# Patient Record
Sex: MALE | Race: WHITE | NOT HISPANIC OR LATINO | Employment: OTHER | URBAN - METROPOLITAN AREA
[De-identification: names, ages, dates, MRNs, and addresses within clinical notes are randomized per-mention and may not be internally consistent; named-entity substitution may affect disease eponyms.]

---

## 2017-07-12 ENCOUNTER — OFFICE VISIT (OUTPATIENT)
Dept: ORTHOPEDIC SURGERY | Facility: CLINIC | Age: 62
End: 2017-07-12

## 2017-07-12 VITALS — BODY MASS INDEX: 36.76 KG/M2 | HEIGHT: 70 IN | WEIGHT: 256.8 LBS | TEMPERATURE: 98.1 F

## 2017-07-12 DIAGNOSIS — M25.571 ACUTE RIGHT ANKLE PAIN: Primary | ICD-10-CM

## 2017-07-12 PROCEDURE — 99203 OFFICE O/P NEW LOW 30 MIN: CPT | Performed by: ORTHOPAEDIC SURGERY

## 2017-07-12 RX ORDER — METOPROLOL SUCCINATE 25 MG/1
25 TABLET, EXTENDED RELEASE ORAL 2 TIMES DAILY
COMMUNITY

## 2017-07-12 RX ORDER — OMEPRAZOLE 20 MG/1
20 CAPSULE, DELAYED RELEASE ORAL DAILY
COMMUNITY

## 2017-07-12 RX ORDER — TAMSULOSIN HYDROCHLORIDE 0.4 MG/1
1 CAPSULE ORAL NIGHTLY
COMMUNITY

## 2017-07-12 RX ORDER — FENOFIBRATE 160 MG/1
160 TABLET ORAL DAILY
COMMUNITY

## 2017-07-12 NOTE — PROGRESS NOTES
Chief Complaint   Patient presents with   • Right Ankle - Establish Care, Pain             HPI  In April, patient got up from chair and ankle popped.  Over time, it got worse.  Pain is on medial aspect of ankle and arch of foot.Patient has been having progressive pain and discomfort since April 2017.  He feels like he is walking on his instep.  His foot keeps going into eversion.  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  By the end of the day he has a dull aching pain on the medial aspect of the foot.  The pain radiates into the forefoot and is associated with some burning sensation along with paresthesias.  He states that he cannot walk briskly because of pain and discomfort.  His symptoms are worse when he is driving for prolonged periods of time.  He has difficulty with flexibility of the hindfoot and finds it difficult to walk on surfaces that are uneven such errors gravel driveways and grassy lots.          No Known Allergies      Social History     Social History   • Marital status:      Spouse name: N/A   • Number of children: N/A   • Years of education: N/A     Occupational History   • Not on file.     Social History Main Topics   • Smoking status: Not on file   • Smokeless tobacco: Not on file   • Alcohol use Not on file   • Drug use: Not on file   • Sexual activity: Not on file     Other Topics Concern   • Not on file     Social History Narrative       No family history on file.    No past surgical history on file.    No past medical history on file.        Vitals:    07/12/17 0847   Temp: 98.1 °F (36.7 °C)             Review of Systems   Constitutional: Negative for chills, fever and unexpected weight change.   HENT: Negative for trouble swallowing and voice change.    Eyes: Negative for visual disturbance.   Respiratory: Negative for cough and shortness of breath.    Cardiovascular: Negative for chest pain and leg swelling.   Gastrointestinal:  Negative for abdominal pain, nausea and vomiting.   Endocrine: Negative for cold intolerance and heat intolerance.   Genitourinary: Negative for difficulty urinating, frequency and urgency.   Musculoskeletal: Positive for arthralgias, joint swelling and myalgias.   Skin: Negative for rash and wound.   Allergic/Immunologic: Negative for immunocompromised state.   Neurological: Negative for weakness and numbness.   Hematological: Does not bruise/bleed easily.   Psychiatric/Behavioral: Negative for dysphoric mood. The patient is not nervous/anxious.            Physical Exam   Constitutional: He is oriented to person, place, and time. He appears well-developed.   HENT:   Head: Normocephalic.   Eyes: Pupils are equal, round, and reactive to light.   Neck: Normal range of motion.   Cardiovascular: Normal rate and intact distal pulses.    Pulmonary/Chest: Effort normal and breath sounds normal.   Abdominal: Soft. Bowel sounds are normal.   Musculoskeletal: Normal range of motion. He exhibits edema, tenderness and deformity.   Neurological: He is alert and oriented to person, place, and time. He has normal reflexes.   Skin: Skin is dry.   Psychiatric: He has a normal mood and affect. His behavior is normal. Judgment and thought content normal.   Nursing note and vitals reviewed.              Joint/Body Part Specific Exam:Right foot:  There is exquisite tenderness posterior to the medial malleolus. Inversion against resistance bothers the patient significantly. Eversion causes pain and discomfort. Longitudinal arches of the foot are poorly preserved. Dorsalis pedis and posterior tibial artery pulses are palpable. Dorsiflexion 0-20 degrees, plantarflexion 0-30 degrees. Ankle mortise is stable. There is a lot of tenderness and swelling along the musculotendinous junction over the posterior tibial and muscle tendon junction. “Too many toes” sign is positive. Patient is unable to perform a single heel raise.  Dorsiflexion and  plantarflexion are both painful for the patient.  He finds it difficult to circumduct his ankle as well.  There is a tendency towards eversion of the hindfoot.          X-RAY Report:  X-rays from an outside facility are reviewed.  The architecture of the hindfoot is fairly well preserved.  No fractures are noted.  There are some degenerative changes with narrowing of the inter-tarsal joint spaces.          Diagnostics:            Dav was seen today for establish care and pain.    Diagnoses and all orders for this visit:    Acute right ankle pain  -     MRI Ankle Right Without Contrast; Future          Procedures          I provided this patient with educational materials regarding bone health.        Plan:   Use a supportive arch supports and good shoes to help support the hindfoot and the posterior tibial tendon function.    Tablet diclofenac 75 mg tab 1 by mouth twice a day when necessary pain swelling and discomfort.     Steroid injection to the posterior tibial tendon sheath discussed with the patient great deal.    Schedule an MRI of the right ankle for evaluation of condition of the posterior tibial tendon.    If he does have a posterior tibial tendon tear he might need reconstruction of the tendon or possibly a hindfoot fusion if the tendon is completely torn and the longitudinal arches of the.    Follow-up in my office in 4 weeks to review the MRI and then possibly set him up for referral to a foot and ankle subspecialist.        CC To Delfin Lyn MD

## 2017-07-18 PROBLEM — M25.571 ACUTE RIGHT ANKLE PAIN: Status: ACTIVE | Noted: 2017-07-18

## 2017-07-26 ENCOUNTER — OFFICE VISIT (OUTPATIENT)
Dept: ORTHOPEDIC SURGERY | Facility: CLINIC | Age: 62
End: 2017-07-26

## 2017-07-26 DIAGNOSIS — M25.571 ACUTE RIGHT ANKLE PAIN: ICD-10-CM

## 2017-07-26 PROCEDURE — 73721 MRI JNT OF LWR EXTRE W/O DYE: CPT | Performed by: ORTHOPAEDIC SURGERY

## 2017-08-14 ENCOUNTER — OFFICE VISIT (OUTPATIENT)
Dept: ORTHOPEDIC SURGERY | Facility: CLINIC | Age: 62
End: 2017-08-14

## 2017-08-14 DIAGNOSIS — M25.571 ACUTE RIGHT ANKLE PAIN: Primary | ICD-10-CM

## 2017-08-14 PROCEDURE — 99213 OFFICE O/P EST LOW 20 MIN: CPT | Performed by: ORTHOPAEDIC SURGERY

## 2017-08-14 NOTE — PROGRESS NOTES
Chief Complaint   Patient presents with   • Right Ankle - Follow-up   • Results     RIGHT ANKLE MRI RESULTS   Patient is here today for his right ankle MRI results.    HPI patient states that he has some pain and discomfort on the posterior medial aspect of the ankle.  Dorsiflexion of the ankle against resistance does bother him to some degree.  Occasionally there is a sense of catching in the ankle.  By the end of the day he does have quite a bit of swelling and his ankle feels tight.  Patient finds it difficult to walk on uneven surfaces.  Going up and down the steps is painful for him.  He states that he has rolled his ankle a couple different times and that is relatively uncomfortable for him.  His [place] visual foot arches are poorly developed and he feels like he is progressively getting flat footed.        There were no vitals filed for this visit.        Review of Systems   Constitutional: Negative.    HENT: Negative.    Eyes: Negative.    Respiratory: Negative.    Cardiovascular: Negative.    Gastrointestinal: Negative.    Endocrine: Negative.    Genitourinary: Negative.    Musculoskeletal: Negative.    Skin: Negative.    Allergic/Immunologic: Negative.    Neurological: Negative.    Hematological: Negative.    Psychiatric/Behavioral: Negative.            Physical Exam   Constitutional: He is oriented to person, place, and time. He appears well-nourished.   HENT:   Head: Atraumatic.   Eyes: EOM are normal.   Neck: Neck supple.   Cardiovascular: Normal rate and intact distal pulses.    Pulmonary/Chest: Breath sounds normal.   Abdominal: Bowel sounds are normal.   Musculoskeletal: He exhibits edema and tenderness. He exhibits no deformity.   Neurological: He is alert and oriented to person, place, and time. He has normal reflexes.   Skin: Skin is dry.   Psychiatric: He has a normal mood and affect. His behavior is normal. Judgment and thought content normal.   Nursing note and vitals  reviewed.          Joint/Body Part Specific Exam:Right ankle:  Patient has diffuse ill-defined tenderness with bruising and swelling over the anterior talofibular ligament. Inversion and eversion against resistance are painful for the patient. Some tenderness is noted posteriorly over the calcaneal fibular ligament as well. Medially the deltoid ligament is swollen and tender. Dorsalis pedis and posterior tibial artery pulses are palpable. Common peroneal nerve function is well preserved. There is no evidence of a proximal fibular injury. Distal tibiofibular syndesmotic ligament appears to be intact. External rotation and squeeze tests are both negative.  Dorsiflexion 0-20°.  Plantar flexion 0-30°.  Patient is able to circumduct the ankle without too much difficulty.      X-RAY Report:        Diagnostics:  MRI report from [facility] shows that the patient has had a previous medial sided ankle sprain.  The lateral ligaments are intact.  There is a small cyst on the medial edge of the talar dome with overlying chondromalacia.  There is some synovitis along the posterior tibial tendon sheath without a distinct tear.  There is some edema on the sinus Tarsi region as well.  No osteochondral fractures are noted.      Dav was seen today for results and follow-up.    Diagnoses and all orders for this visit:    Acute right ankle pain          Procedures        Plan:  Nonoperative care discussed with the patient.    Use a supportive active ankle brace.    Tablet ibuprofen 600 mg orally twice a day when necessary pain and discomfort.    Local application of ice for physical comfort.    Calcium and vitamin D for bone health.    Reinjury precautions.    Follow-up in my office in 6 months for reevaluation.    Nonoperative care at this point.

## 2018-11-26 ENCOUNTER — OFFICE VISIT CONVERTED (OUTPATIENT)
Dept: SURGERY | Facility: CLINIC | Age: 63
End: 2018-11-26
Attending: UROLOGY

## 2019-05-07 ENCOUNTER — HOSPITAL ENCOUNTER (OUTPATIENT)
Dept: SURGERY | Facility: CLINIC | Age: 64
Discharge: HOME | End: 2019-05-07

## 2019-05-07 ENCOUNTER — PROCEDURE VISIT CONVERTED (OUTPATIENT)
Dept: SURGERY | Facility: CLINIC | Age: 64
End: 2019-05-07
Attending: UROLOGY

## 2019-05-14 ENCOUNTER — OFFICE VISIT CONVERTED (OUTPATIENT)
Dept: SURGERY | Facility: CLINIC | Age: 64
End: 2019-05-14
Attending: UROLOGY

## 2019-05-14 ENCOUNTER — CONVERSION ENCOUNTER (OUTPATIENT)
Dept: SURGERY | Facility: CLINIC | Age: 64
End: 2019-05-14

## 2021-02-24 ENCOUNTER — CONVERSION ENCOUNTER (OUTPATIENT)
Dept: SURGERY | Facility: CLINIC | Age: 66
End: 2021-02-24

## 2021-02-24 ENCOUNTER — OFFICE VISIT CONVERTED (OUTPATIENT)
Dept: UROLOGY | Facility: CLINIC | Age: 66
End: 2021-02-24
Attending: UROLOGY

## 2021-05-14 VITALS — WEIGHT: 242.12 LBS | RESPIRATION RATE: 16 BRPM | BODY MASS INDEX: 33.9 KG/M2 | HEIGHT: 71 IN

## 2021-05-14 NOTE — PROGRESS NOTES
Progress Note      Patient Name: Dav Lu   Patient ID: [id number]   Sex: Male   YOB: 1955    Primary Care Provider: Delfin Lyn MD   Referring Provider: Delfin Lyn MD    Visit Date: February 24, 2021    Provider: Flora Perez MD   Location: [facility]   Location Address: [address] [place] [address]  [id number]   Location Phone: [phone]          Chief Complaint  · Urologic complaint      History Of Present Illness     This patient is a 62yo male. He presents from Dr. Lyn for eval of PSA level.   PSA 9/2017 - 4.3  PSA 7/2017 - 5.3  Patient was given abx after first PSA and then had it rechecked.   The patient admits to nocturia 2-3 times per night along with some hesitancy. He is on Flomax for urinary symptoms.  There is family hx of prostate cancer in father who was treated in the his late 80s.   Patient has never had prostate biopsy.    11/26/18 - Patient presents in follow up. He is doing well. He is still on Flomax. He had a repeat PSA and this is now 5.58. We discussed this. I do not know what has caused the PSA to be elevated. I discussed that I would recommend a TRUS bx of the prostate gland. We discussed doing an MRI of the prostate to eval for suspicious areas. He would like to think about things. He has new insurance starting in 2019 and will decide around that time.    5/14/19 - Patient presents in follow up. He is doing well after having a TRUS bx of the prostate. This showed benign tissue with some inflammation. We discussed this. We discussed the possibility of a false negative study. I have recommended continuing to follow the PSA.    Update 2/24/21: Presents for follow up with PSA prior.  Complains of baseline lower urinary tract symptoms, slow flow, most bothersome is nocturia. Manages with decreasing fluid intake prior to sleep; getting up 1x per night; also has some urgency to the point of leakage on occasion. Most bothersome when  "getting.  Takes Flomax and knows that it helps his symptoms significantly. Also has ED for which he takes Cialis 20mg gets about 75% of erection; he is ok with that.     ________  PSA  6/4/2020: 5.13  7/18/2018: 5.58  9/2017: 4.3  7/2017: 5.3    Total testosterone, 6/4/2020: 255    TRUS biopsy pathology, 5/2019: Benign prostatic tissue 12 of 12 cores       Past Medical History  Atrial Fibrillation; BPH; BPH with Urinary Obstruction; ED; Elevated PSA; Gastroesophageal Reflux Disorder; High blood pressure; High Triglycerides; Larynx cancer; Pancreatitis         Past Surgical History  Cholecystectomy; Port-a-cath Placement; Josh Cath Removal; Prostate Biopsy; Tracheostomy, temporary         Medication List  fenofibrate 160 mg oral tablet; levothyroxine 75 mcg oral tablet; metoprolol tartrate 25 mg oral tablet; omeprazole 20 mg oral capsule,delayed release(DR/EC); tamsulosin 0.4 mg oral capsule,extended release 24hr; Vitamin D3 2,000 unit oral capsule         Allergy List  NO KNOWN DRUG ALLERGIES       Allergies Reconciled  Family Medical History  Hypertension         Social History  Tobacco (Former)         Review of Systems  · Constitutional  o Denies  o : fever, chills  · Gastrointestinal  o Denies  o : nausea, vomiting      Vitals  Date Time BP Position Site L\R Cuff Size HR RR TEMP (F) WT  HT  BMI kg/m2 BSA m2 O2 Sat FR L/min FiO2 HC       02/24/2021 09:29 AM       16  242lbs 2oz 5'  11\" 33.77 2.35             Physical Examination  · Constitutional  o Appearance  o : Well nourished, well developed patient in no acute distress.  · Eyes  o Conjunctivae  o : Conjunctivae normal  o Sclerae  o : Sclerae white  · Ears, Nose, Mouth and Throat  o Ears  o :   § Hearing  § : Intact to conversational voice both ears  § Ears  § : Normal  o Nose  o :   § External Nose  § : Appearance normal  · Skin and Subcutaneous Tissue  o General Inspection  o : No rashes, lesions, or areas of discoloration present  o General Palpation  o : " Skin Turgor Normal  · Neurologic  o Mental Status Examination  o :   § Orientation  § : Alert and Oriented X3  o Gait and Station  o :   § Gait Screening  § : Ambulating without difficulty  · Psychiatric  o Mood and Affect  o : Mood normal, affect appropriate              Assessment  · Elevated PSA     790.93/R97.20  · Urinary urgency     788.63/R39.15  · BPH w urinary obs/LUTS       Benign prostatic hyperplasia with lower urinary tract symptoms     600.01/N40.1    Problems Reconciled  Plan  · Orders  o PSA Ultrasensitive, ANNUAL SCREENING [facility] ([id number], ) - 790.93/R97.20 - 08/24/2021  · Medications  o Medications have been Reconciled  o Transition of Care or Provider Policy  · Instructions  o Electronically Identified Patient Education Materials Provided Electronically     Elevated PSA, most recent 6/2020 of 5.13, stable trend from previous; status post negative biopsy in 2019.  Recommend continued surveillance; due to have annual PSA in June.    Other options provided include to proceed with further work-up via prostate MRI; discussed that this would likely be the recommendation should there be a significant change in trend; patient is understanding and agreeable to this.  Notes understanding that whether or not further workup for prostate cancer is pursued,  a diagnosis of clinically significant prostate cancer would remains uncertain unless detected on biopsy. A negative biopsy, although reassuring, would not eliminate the possible need for further surveillance of PSA, possible future biopsy, and imaging as he may have undetected prostate cancer.    BPH with lower urinary tract symptomscontinue Flomax    Overactive bladder, urinary urgencythis was discussed at length; discussed the importance of behavioral modifications; fluid management, timed and double voiding, avoiding bladder irritants.  Given patient's associated bother when going out; perform trial of oxybutynin IR 5 mg as needed urinary urgency;  instructed to take 30-45 minutes prior to going out.  Side effects discussed including dry mouth and constipation.  Patient encouraged to call office should this medication be of benefit or using consistently as could convert to an extended release tab that he would take daily rather than as needed  Prescription sent to pharmacy    Follow-up 6 months with PSA prior (anticipates getting this through PCP) or earlier as needed  All question addressed          MDM moderate: 2 stable chronic illnesses; 1 undiagnosed new problem with uncertain prognosis (urinary urgency, OAB); reviewed result of test; ordered test; provided independent interpretation of the results; prescription drug management             Electronically Signed by: Flora Perez MD -Author on February 24, 2021 09:51:55 AM

## 2021-05-15 VITALS — WEIGHT: 252 LBS | BODY MASS INDEX: 35.28 KG/M2 | HEIGHT: 71 IN | RESPIRATION RATE: 16 BRPM

## 2021-05-16 VITALS — HEIGHT: 71 IN | BODY MASS INDEX: 35.3 KG/M2 | WEIGHT: 252.12 LBS | RESPIRATION RATE: 12 BRPM

## 2022-02-25 ENCOUNTER — TRANSCRIBE ORDERS (OUTPATIENT)
Dept: ADMINISTRATIVE | Facility: HOSPITAL | Age: 67
End: 2022-02-25

## 2022-02-25 DIAGNOSIS — R07.9 CHEST PAIN, UNSPECIFIED TYPE: Primary | ICD-10-CM

## 2022-02-25 DIAGNOSIS — R94.31 ABNORMAL EKG: ICD-10-CM

## 2022-03-17 ENCOUNTER — APPOINTMENT (OUTPATIENT)
Dept: NUCLEAR MEDICINE | Facility: HOSPITAL | Age: 67
End: 2022-03-17

## 2022-08-04 ENCOUNTER — HOSPITAL ENCOUNTER (OUTPATIENT)
Dept: NUCLEAR MEDICINE | Facility: HOSPITAL | Age: 67
Discharge: HOME | End: 2022-08-04

## 2022-08-04 DIAGNOSIS — R94.31 ABNORMAL EKG: ICD-10-CM

## 2022-08-04 DIAGNOSIS — R07.9 CHEST PAIN, UNSPECIFIED TYPE: ICD-10-CM

## 2022-08-04 LAB
BH CV IMMEDIATE POST RECOVERY TECH DATA SYMPTOMS: NORMAL
BH CV IMMEDIATE POST TECH DATA BLOOD PRESSURE: NORMAL MMHG
BH CV IMMEDIATE POST TECH DATA HEART RATE: 82 BPM
BH CV IMMEDIATE POST TECH DATA OXYGEN SATS: 94 %
BH CV REST NUCLEAR ISOTOPE DOSE: 10.1 MCI
BH CV SIX MINUTE RECOVERY TECH DATA BLOOD PRESSURE: NORMAL
BH CV SIX MINUTE RECOVERY TECH DATA HEART RATE: 76 BPM
BH CV SIX MINUTE RECOVERY TECH DATA OXYGEN SATURATION: 96 %
BH CV STRESS BP STAGE 1: NORMAL
BH CV STRESS COMMENTS STAGE 1: NORMAL
BH CV STRESS DOSE REGADENOSON STAGE 1: 0.4
BH CV STRESS DURATION MIN STAGE 1: 0
BH CV STRESS DURATION SEC STAGE 1: 10
BH CV STRESS HR STAGE 1: 82
BH CV STRESS NUCLEAR ISOTOPE DOSE: 37.5 MCI
BH CV STRESS O2 STAGE 1: 94
BH CV STRESS PROTOCOL 1: NORMAL
BH CV STRESS RECOVERY BP: NORMAL MMHG
BH CV STRESS RECOVERY HR: 76 BPM
BH CV STRESS RECOVERY O2: 96 %
BH CV STRESS STAGE 1: 1
BH CV THREE MINUTE POST TECH DATA BLOOD PRESSURE: NORMAL MMHG
BH CV THREE MINUTE POST TECH DATA HEART RATE: 76 BPM
BH CV THREE MINUTE POST TECH DATA OXYGEN SATURATION: 96 %
LV EF NUC BP: 45 %
MAXIMAL PREDICTED HEART RATE: 153 BPM
PERCENT MAX PREDICTED HR: 56.86 %
STRESS BASELINE BP: NORMAL MMHG
STRESS BASELINE HR: 58 BPM
STRESS O2 SAT REST: 95 %
STRESS PERCENT HR: 67 %
STRESS POST O2 SAT PEAK: 95 %
STRESS POST PEAK BP: NORMAL MMHG
STRESS POST PEAK HR: 87 BPM
STRESS TARGET HR: 130 BPM

## 2022-08-04 PROCEDURE — 93017 CV STRESS TEST TRACING ONLY: CPT

## 2022-08-04 PROCEDURE — 0 TECHNETIUM TETROFOSMIN KIT: Performed by: INTERNAL MEDICINE

## 2022-08-04 PROCEDURE — 78452 HT MUSCLE IMAGE SPECT MULT: CPT | Performed by: INTERNAL MEDICINE

## 2022-08-04 PROCEDURE — 78452 HT MUSCLE IMAGE SPECT MULT: CPT

## 2022-08-04 PROCEDURE — 25010000002 REGADENOSON 0.4 MG/5ML SOLUTION

## 2022-08-04 PROCEDURE — A9502 TC99M TETROFOSMIN: HCPCS | Performed by: INTERNAL MEDICINE

## 2022-08-04 PROCEDURE — 93018 CV STRESS TEST I&R ONLY: CPT | Performed by: INTERNAL MEDICINE

## 2022-08-04 RX ORDER — LEVOTHYROXINE SODIUM 0.07 MG/1
75 TABLET ORAL DAILY
COMMUNITY

## 2022-08-04 RX ADMIN — REGADENOSON 0.4 MG: 0.08 INJECTION, SOLUTION INTRAVENOUS at 10:17

## 2022-08-04 RX ADMIN — TETROFOSMIN 1 DOSE: 1.38 INJECTION, POWDER, LYOPHILIZED, FOR SOLUTION INTRAVENOUS at 08:40

## 2022-08-04 RX ADMIN — TETROFOSMIN 1 DOSE: 1.38 INJECTION, POWDER, LYOPHILIZED, FOR SOLUTION INTRAVENOUS at 10:17

## 2022-11-30 ENCOUNTER — OFFICE VISIT (OUTPATIENT)
Dept: CARDIOLOGY | Facility: CLINIC | Age: 67
End: 2022-11-30

## 2022-11-30 VITALS
HEIGHT: 71 IN | SYSTOLIC BLOOD PRESSURE: 123 MMHG | WEIGHT: 238 LBS | BODY MASS INDEX: 33.32 KG/M2 | DIASTOLIC BLOOD PRESSURE: 84 MMHG | HEART RATE: 83 BPM

## 2022-11-30 DIAGNOSIS — R94.39 ABNORMAL NUCLEAR STRESS TEST: Primary | ICD-10-CM

## 2022-11-30 DIAGNOSIS — R06.02 SOB (SHORTNESS OF BREATH): ICD-10-CM

## 2022-11-30 DIAGNOSIS — I10 ESSENTIAL HYPERTENSION: ICD-10-CM

## 2022-11-30 PROCEDURE — 99204 OFFICE O/P NEW MOD 45 MIN: CPT | Performed by: INTERNAL MEDICINE

## 2022-11-30 RX ORDER — ASPIRIN 81 MG/1
81 TABLET ORAL DAILY
Qty: 30 TABLET | Refills: 1 | Status: SHIPPED | OUTPATIENT
Start: 2022-11-30

## 2022-11-30 NOTE — ASSESSMENT & PLAN NOTE
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  LV ejection fraction 45%.  Patient denies any significant chest pain but does report exertional shortness of breath.  We will do an echocardiogram as a first step to reassess LV function and wall motion abnormalities.  Recommend to start taking aspirin 81 mg p.o. daily.  We will get the latest labs including lipid panel from PCP office.  Statins to be initiated after reviewing the labs.  Eventually, he will need a Cardiac catheterization to delineate the coronary anatomy because of the findings on SPECT imaging and exertional shortness of breath.

## 2022-11-30 NOTE — PROGRESS NOTES
CARDIOLOGY INITIAL CONSULT       Chief Complaint  ABNORMAL STRESS TEST, Hypertension, and Fatigue    Subjective            Dav Lu presents to [facility] CARD[facility]  History of Present Illness    This is a 67-year-old male with hypertension, hypothyroidism and GERD.  He was referred for cardiac evaluation because of an abnormal stress test.  During routine office visit with PCP in January, he underwent EKG which showed presence of an old infarct.  Subsequently a SPECT stress test was scheduled.  Patient finally had the test done in August of this year which showed old inferior wall infarct with ejection fraction 45%.  There is some suggestion of mild carissa-infarct ischemia as well.    Patient does not have history of myocardial infarction.  He had an episode of chest pain in the past, which was treated in the emergency room.  He had an echocardiogram in 2014 during admission for gallstone pancreatitis.  Echo was reported as normal study.  He denies any recent episodes of chest pain.  He gets short of breath and fatigue on mild-to-moderate exertion.  Denies palpitations or dizziness.       Past History:    Medical History:  Past Medical History:   Diagnosis Date   • Hyperlipidemia    • Hypertension        Surgical History: has a past surgical history that includes Cholecystectomy (2014).     Family History: family history includes Heart disease in his father.     Social History: reports that he has never smoked. He has never used smokeless tobacco. He reports that he does not currently use alcohol. He reports that he does not use drugs.    Allergies: Patient has no known allergies.    Current Outpatient Medications on File Prior to Visit   Medication Sig   • fenofibrate 160 MG tablet Take 160 mg by mouth Daily.   • levothyroxine (SYNTHROID, LEVOTHROID) 75 MCG tablet Take 75 mcg by mouth Daily.   • metoprolol succinate XL (TOPROL-XL) 25 MG 24 hr tablet Take 25 mg by mouth 2 (Two) Times a Day.  "  • omeprazole (priLOSEC) 20 MG capsule Take 20 mg by mouth Daily.   • tamsulosin (FLOMAX) 0.4 MG capsule 24 hr capsule Take 1 capsule by mouth Every Night.     No current facility-administered medications on file prior to visit.          Review of Systems   Constitutional: Negative for fatigue, unexpected weight gain and unexpected weight loss.   Eyes: Negative for double vision.   Respiratory: Positive for shortness of breath. Negative for cough and wheezing.    Cardiovascular: Negative for chest pain, palpitations and leg swelling.   Gastrointestinal: Negative for abdominal pain, nausea and vomiting.   Endocrine: Negative for cold intolerance, heat intolerance, polydipsia and polyuria.   Musculoskeletal: Negative for arthralgias and back pain.   Skin: Negative for color change.   Neurological: Negative for dizziness, syncope, weakness and headache.   Hematological: Does not bruise/bleed easily.        Objective     /84   Pulse 83   Ht 180.3 cm (71\")   Wt 108 kg (238 lb)   BMI 33.19 kg/m²       Physical Exam  Constitutional:       General: He is awake. He is not in acute distress.     Appearance: Normal appearance.   Eyes:      Extraocular Movements: Extraocular movements intact.      Pupils: Pupils are equal, round, and reactive to light.   Neck:      Thyroid: No thyromegaly.      Vascular: No carotid bruit or JVD.   Cardiovascular:      Rate and Rhythm: Normal rate and regular rhythm.      Chest Wall: PMI is not displaced.      Heart sounds: Normal heart sounds, S1 normal and S2 normal. No murmur heard.    No friction rub. No gallop. No S3 or S4 sounds.   Pulmonary:      Effort: Pulmonary effort is normal. No respiratory distress.      Breath sounds: Normal breath sounds. No wheezing, rhonchi or rales.   Abdominal:      General: Bowel sounds are normal.      Palpations: Abdomen is soft.      Tenderness: There is no abdominal tenderness.   Musculoskeletal:      Cervical back: Neck supple.      Right " lower leg: No edema.      Left lower leg: No edema.   Skin:     Nails: There is no clubbing.   Neurological:      General: No focal deficit present.      Mental Status: He is alert and oriented to person, place, and time.           Result Review :     The following data was reviewed by: Didier Segura MD on 11/30/2022:      No recent labs available for review.  We will get the latest labs from PCP office.         Data reviewed: Cardiology studies        Results for orders placed during the hospital encounter of 08/04/22    Stress test with myocardial perfusion one day    Interpretation Summary  · Abnormal myocardial SPECT perfusion study showing a small to moderate sized inferolateral wall infarct with mild carissa-infarct ischemia.  · Left ventricular ejection fraction is mildly reduced. (Calculated EF = 45%). There is mild inferior wall hypokinesis.  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  · There were no new ischemic EKG changes with regadenoson infusion.  · There was no chest pain with regadenoson infusion.  · Impressions are consistent with an intermediate risk study.      Echocardiogram done on 2/13/2014 showed LV ejection fraction of 60%, diastolic dysfunction and no significant valvular abnormalities.    EKG done at the time of stress testing showed old inferior wall infarct.               Assessment and Plan        Diagnoses and all orders for this visit:    1. 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  LV ejection fraction 45%.  Patient denies any significant chest pain but does report exertional shortness of breath.  We will do an echocardiogram as a first step to reassess LV function and wall motion abnormalities.  Recommend to start taking aspirin 81 mg p.o. daily.  We will get the latest labs including lipid panel from PCP office.  Statins to be initiated after  reviewing the labs.  Eventually, he will need a Cardiac catheterization to delineate the coronary anatomy because of the findings on SPECT imaging and exertional shortness of breath.    Orders:  -     aspirin 81 MG EC tablet; Take 1 tablet by mouth Daily.  Dispense: 30 tablet; Refill: 1    2. SOB (shortness of breath)  Assessment & Plan:  We will proceed with echocardiogram to assess the LV function and regional wall motion normalities.    Orders:  -     Adult Transthoracic Echo Complete W/ Cont if Necessary Per Protocol; Future    3. Essential hypertension  Assessment & Plan:  Blood pressure reasonably well controlled.  Continue metoprolol succinate 25 mg twice daily.        I spent 35 minutes caring for Dav on this date of service. This time includes time spent by me in the following activities:reviewing tests, obtaining and/or reviewing a separately obtained history, performing a medically appropriate examination and/or evaluation , ordering medications, tests, or procedures, and documenting information in the medical record    Follow Up     Return for Will schedule f/u after reviewing test results.    Patient was given instructions and counseling regarding his condition or for health maintenance advice. Please see specific information pulled into the AVS if appropriate.

## 2022-11-30 NOTE — ASSESSMENT & PLAN NOTE
We will proceed with echocardiogram to assess the LV function and regional wall motion normalities.

## 2022-12-02 ENCOUNTER — TELEPHONE (OUTPATIENT)
Dept: CARDIOLOGY | Facility: CLINIC | Age: 67
End: 2022-12-02

## 2022-12-02 ENCOUNTER — PREP FOR SURGERY (OUTPATIENT)
Dept: OTHER | Facility: HOSPITAL | Age: 67
End: 2022-12-02

## 2022-12-02 DIAGNOSIS — I20.9 ANGINA PECTORIS: Primary | ICD-10-CM

## 2022-12-02 RX ORDER — NITROGLYCERIN 0.4 MG/1
0.4 TABLET SUBLINGUAL
Status: CANCELLED | OUTPATIENT
Start: 2022-12-02

## 2022-12-02 RX ORDER — SODIUM CHLORIDE 9 MG/ML
75 INJECTION, SOLUTION INTRAVENOUS CONTINUOUS
Status: CANCELLED | OUTPATIENT
Start: 2022-12-02

## 2022-12-02 NOTE — TELEPHONE ENCOUNTER
----- Message from Didier Segura MD sent at 12/2/2022 12:57 PM EST -----  Echocardiogram reviewed.  Overall, heart function is normal.  There is a small segment in the bottom wall of the heart, which looks like an old heart attack.    Also reviewed labs done in February at Dr. Yin's office.    Recommend to have Cardiac catheterization as the next step to assess for any blockages in the heart arteries and treat it if needed.  We discussed about this during recent office visit.    Cardiac cath can be scheduled on Tuesday, 12/13/2022 if the patient is agreeable.       Electronically signed by Didier Segura MD, 12/02/22, 12:57 PM EST.

## 2022-12-02 NOTE — TELEPHONE ENCOUNTER
Spoke with patient. Went over results. Patient is agreeable to [facility] on 12/13/22. Instructed patient on NPO after midnight on 12/12/22, bring a  for transportation and bring an over night bag just incase with personal items. Patient verbalized understanding and appreciation. Patient instructed scheduling will call to let patient know when to arrive at the hospital.

## 2022-12-02 NOTE — TELEPHONE ENCOUNTER
Requesting most recent lab results from PCP office. SUDHIR Hines. She is faxing over last BMP, Lipid Panel- dated 2/2022.

## 2022-12-06 ENCOUNTER — TELEPHONE (OUTPATIENT)
Dept: CARDIOLOGY | Facility: CLINIC | Age: 67
End: 2022-12-06

## 2022-12-06 NOTE — TELEPHONE ENCOUNTER
I spoke to patient and gave arrival time of 6:30 on 12/13/22 for [facility] as well as all other instructions.

## 2022-12-13 ENCOUNTER — HOSPITAL ENCOUNTER (OUTPATIENT)
Facility: HOSPITAL | Age: 67
Setting detail: HOSPITAL OUTPATIENT SURGERY
Discharge: HOME | End: 2022-12-13
Attending: INTERNAL MEDICINE | Admitting: INTERNAL MEDICINE

## 2022-12-13 VITALS
OXYGEN SATURATION: 93 % | RESPIRATION RATE: 18 BRPM | DIASTOLIC BLOOD PRESSURE: 95 MMHG | TEMPERATURE: 97.9 F | HEART RATE: 86 BPM | SYSTOLIC BLOOD PRESSURE: 130 MMHG

## 2022-12-13 DIAGNOSIS — I10 ESSENTIAL HYPERTENSION: Primary | ICD-10-CM

## 2022-12-13 DIAGNOSIS — I20.9 ANGINA PECTORIS: ICD-10-CM

## 2022-12-13 LAB
ANION GAP SERPL CALCULATED.3IONS-SCNC: 9.4 MMOL/L (ref 5–15)
BASOPHILS # BLD AUTO: 0.02 10*3/MM3 (ref 0–0.2)
BASOPHILS NFR BLD AUTO: 0.5 % (ref 0–1.5)
BUN SERPL-MCNC: 26 MG/DL (ref 8–23)
BUN/CREAT SERPL: 18.8 (ref 7–25)
CALCIUM SPEC-SCNC: 9 MG/DL (ref 8.6–10.5)
CHLORIDE SERPL-SCNC: 104 MMOL/L (ref 98–107)
CHOLEST SERPL-MCNC: 114 MG/DL (ref 0–200)
CO2 SERPL-SCNC: 23.6 MMOL/L (ref 22–29)
CREAT SERPL-MCNC: 1.38 MG/DL (ref 0.76–1.27)
DEPRECATED RDW RBC AUTO: 42.1 FL (ref 37–54)
EGFRCR SERPLBLD CKD-EPI 2021: 55.7 ML/MIN/1.73
EOSINOPHIL # BLD AUTO: 0.08 10*3/MM3 (ref 0–0.4)
EOSINOPHIL NFR BLD AUTO: 1.8 % (ref 0.3–6.2)
ERYTHROCYTE [DISTWIDTH] IN BLOOD BY AUTOMATED COUNT: 13.9 % (ref 12.3–15.4)
GLUCOSE SERPL-MCNC: 100 MG/DL (ref 65–99)
HCT VFR BLD AUTO: 39.9 % (ref 37.5–51)
HDLC SERPL-MCNC: 37 MG/DL (ref 40–60)
HGB BLD-MCNC: 13.6 G/DL (ref 13–17.7)
IMM GRANULOCYTES # BLD AUTO: 0.01 10*3/MM3 (ref 0–0.05)
IMM GRANULOCYTES NFR BLD AUTO: 0.2 % (ref 0–0.5)
INR PPP: 1.06 (ref 0.86–1.15)
LDLC SERPL CALC-MCNC: 59 MG/DL (ref 0–100)
LDLC/HDLC SERPL: 1.59 {RATIO}
LYMPHOCYTES # BLD AUTO: 1.18 10*3/MM3 (ref 0.7–3.1)
LYMPHOCYTES NFR BLD AUTO: 27.2 % (ref 19.6–45.3)
MCH RBC QN AUTO: 28.3 PG (ref 26.6–33)
MCHC RBC AUTO-ENTMCNC: 34.1 G/DL (ref 31.5–35.7)
MCV RBC AUTO: 83.1 FL (ref 79–97)
MONOCYTES # BLD AUTO: 0.38 10*3/MM3 (ref 0.1–0.9)
MONOCYTES NFR BLD AUTO: 8.8 % (ref 5–12)
NEUTROPHILS NFR BLD AUTO: 2.67 10*3/MM3 (ref 1.7–7)
NEUTROPHILS NFR BLD AUTO: 61.5 % (ref 42.7–76)
NRBC BLD AUTO-RTO: 0 /100 WBC (ref 0–0.2)
PLATELET # BLD AUTO: 210 10*3/MM3 (ref 140–450)
PMV BLD AUTO: 10.1 FL (ref 6–12)
POTASSIUM SERPL-SCNC: 4.4 MMOL/L (ref 3.5–5.2)
PROTHROMBIN TIME: 14 SECONDS (ref 11.8–14.9)
RBC # BLD AUTO: 4.8 10*6/MM3 (ref 4.14–5.8)
SODIUM SERPL-SCNC: 137 MMOL/L (ref 136–145)
TRIGL SERPL-MCNC: 90 MG/DL (ref 0–150)
VLDLC SERPL-MCNC: 18 MG/DL (ref 5–40)
WBC NRBC COR # BLD: 4.34 10*3/MM3 (ref 3.4–10.8)

## 2022-12-13 PROCEDURE — 0 IOPAMIDOL PER 1 ML: Performed by: INTERNAL MEDICINE

## 2022-12-13 PROCEDURE — 25010000002 HEPARIN (PORCINE) PER 1000 UNITS: Performed by: INTERNAL MEDICINE

## 2022-12-13 PROCEDURE — 25010000002 MIDAZOLAM PER 1 MG: Performed by: INTERNAL MEDICINE

## 2022-12-13 PROCEDURE — 93458 L HRT ARTERY/VENTRICLE ANGIO: CPT | Performed by: INTERNAL MEDICINE

## 2022-12-13 PROCEDURE — 85025 COMPLETE CBC W/AUTO DIFF WBC: CPT | Performed by: INTERNAL MEDICINE

## 2022-12-13 PROCEDURE — 93010 ELECTROCARDIOGRAM REPORT: CPT | Performed by: INTERNAL MEDICINE

## 2022-12-13 PROCEDURE — C1894 INTRO/SHEATH, NON-LASER: HCPCS | Performed by: INTERNAL MEDICINE

## 2022-12-13 PROCEDURE — 85610 PROTHROMBIN TIME: CPT | Performed by: INTERNAL MEDICINE

## 2022-12-13 PROCEDURE — 94799 UNLISTED PULMONARY SVC/PX: CPT

## 2022-12-13 PROCEDURE — 93005 ELECTROCARDIOGRAM TRACING: CPT | Performed by: INTERNAL MEDICINE

## 2022-12-13 PROCEDURE — 80061 LIPID PANEL: CPT | Performed by: INTERNAL MEDICINE

## 2022-12-13 PROCEDURE — 99153 MOD SED SAME PHYS/QHP EA: CPT | Performed by: INTERNAL MEDICINE

## 2022-12-13 PROCEDURE — 99152 MOD SED SAME PHYS/QHP 5/>YRS: CPT | Performed by: INTERNAL MEDICINE

## 2022-12-13 PROCEDURE — 25010000002 FENTANYL CITRATE (PF) 100 MCG/2ML SOLUTION: Performed by: INTERNAL MEDICINE

## 2022-12-13 PROCEDURE — C1769 GUIDE WIRE: HCPCS | Performed by: INTERNAL MEDICINE

## 2022-12-13 PROCEDURE — 80048 BASIC METABOLIC PNL TOTAL CA: CPT | Performed by: INTERNAL MEDICINE

## 2022-12-13 RX ORDER — ATORVASTATIN CALCIUM 10 MG/1
10 TABLET, FILM COATED ORAL NIGHTLY
Qty: 90 TABLET | Refills: 3 | Status: SHIPPED | OUTPATIENT
Start: 2022-12-13 | End: 2023-02-28 | Stop reason: SDUPTHER

## 2022-12-13 RX ORDER — HEPARIN SODIUM 1000 [USP'U]/ML
INJECTION, SOLUTION INTRAVENOUS; SUBCUTANEOUS
Status: DISCONTINUED | OUTPATIENT
Start: 2022-12-13 | End: 2022-12-13 | Stop reason: HOSPADM

## 2022-12-13 RX ORDER — MIDAZOLAM HYDROCHLORIDE 1 MG/ML
INJECTION INTRAMUSCULAR; INTRAVENOUS
Status: DISCONTINUED | OUTPATIENT
Start: 2022-12-13 | End: 2022-12-13 | Stop reason: HOSPADM

## 2022-12-13 RX ORDER — FENTANYL CITRATE 50 UG/ML
INJECTION, SOLUTION INTRAMUSCULAR; INTRAVENOUS
Status: DISCONTINUED | OUTPATIENT
Start: 2022-12-13 | End: 2022-12-13 | Stop reason: HOSPADM

## 2022-12-13 RX ORDER — NITROGLYCERIN 0.4 MG/1
0.4 TABLET SUBLINGUAL
Status: DISCONTINUED | OUTPATIENT
Start: 2022-12-13 | End: 2022-12-13 | Stop reason: HOSPADM

## 2022-12-13 RX ORDER — SODIUM CHLORIDE 9 MG/ML
75 INJECTION, SOLUTION INTRAVENOUS CONTINUOUS
Status: DISCONTINUED | OUTPATIENT
Start: 2022-12-13 | End: 2022-12-13 | Stop reason: HOSPADM

## 2022-12-13 RX ORDER — SODIUM CHLORIDE 9 MG/ML
100 INJECTION, SOLUTION INTRAVENOUS CONTINUOUS
Status: ACTIVE | OUTPATIENT
Start: 2022-12-13 | End: 2022-12-13

## 2022-12-13 RX ORDER — LIDOCAINE HYDROCHLORIDE 20 MG/ML
INJECTION, SOLUTION INFILTRATION; PERINEURAL
Status: DISCONTINUED | OUTPATIENT
Start: 2022-12-13 | End: 2022-12-13 | Stop reason: HOSPADM

## 2022-12-13 RX ADMIN — SODIUM CHLORIDE 100 ML/HR: 9 INJECTION, SOLUTION INTRAVENOUS at 10:20

## 2022-12-13 NOTE — PLAN OF CARE
Goal Outcome Evaluation:      Patient adequate for discharge per MD order. Patient stable at time of discharge. Patient's TR band removed and pressure dressing applied. Patient and daughter educated on follow up- appointments and radial site care.        Vanda Reid RN

## 2022-12-13 NOTE — INTERVAL H&P NOTE
H&P reviewed. The patient was examined and there are no changes to the H&P.      We will proceed with Cardiac catheterization to delineate the coronary anatomy and angioplasty if indicated.  The risks, benefits and alternatives of the test were explained detail to the patient and daughter who was at bedside.  Both of them agreed for the procedure.     Cardiac cath once BMP is available      Electronically signed by Didier Segura MD, 12/13/22, 8:18 AM EST.

## 2022-12-16 ENCOUNTER — LAB (OUTPATIENT)
Dept: LAB | Facility: HOSPITAL | Age: 67
End: 2022-12-16

## 2022-12-16 DIAGNOSIS — I10 ESSENTIAL HYPERTENSION: ICD-10-CM

## 2022-12-16 LAB
ANION GAP SERPL CALCULATED.3IONS-SCNC: 7 MMOL/L (ref 5–15)
BUN SERPL-MCNC: 22 MG/DL (ref 8–23)
BUN/CREAT SERPL: 16.2 (ref 7–25)
CALCIUM SPEC-SCNC: 9.3 MG/DL (ref 8.6–10.5)
CHLORIDE SERPL-SCNC: 102 MMOL/L (ref 98–107)
CO2 SERPL-SCNC: 28 MMOL/L (ref 22–29)
CREAT SERPL-MCNC: 1.36 MG/DL (ref 0.76–1.27)
EGFRCR SERPLBLD CKD-EPI 2021: 56.7 ML/MIN/1.73
GLUCOSE SERPL-MCNC: 97 MG/DL (ref 65–99)
POTASSIUM SERPL-SCNC: 4.2 MMOL/L (ref 3.5–5.2)
SODIUM SERPL-SCNC: 137 MMOL/L (ref 136–145)

## 2022-12-16 PROCEDURE — 36415 COLL VENOUS BLD VENIPUNCTURE: CPT

## 2022-12-16 PROCEDURE — 80048 BASIC METABOLIC PNL TOTAL CA: CPT

## 2022-12-18 LAB — QT INTERVAL: 386 MS

## 2022-12-20 NOTE — PROGRESS NOTES
Recent labs showed no significant changes.  Kidney functions are stable.  Potassium is within normal limits.    Continue all the current medications, follow-up as scheduled earlier.      Electronically signed by Didier Segura MD, 12/20/22, 1:59 PM EST.

## 2023-01-16 ENCOUNTER — OFFICE VISIT (OUTPATIENT)
Dept: CARDIOLOGY | Facility: CLINIC | Age: 68
End: 2023-01-16
Payer: MEDICARE

## 2023-01-16 VITALS
SYSTOLIC BLOOD PRESSURE: 112 MMHG | HEIGHT: 71 IN | BODY MASS INDEX: 33.46 KG/M2 | WEIGHT: 239 LBS | HEART RATE: 87 BPM | DIASTOLIC BLOOD PRESSURE: 87 MMHG

## 2023-01-16 DIAGNOSIS — I25.10 CORONARY ARTERY DISEASE INVOLVING NATIVE CORONARY ARTERY OF NATIVE HEART WITHOUT ANGINA PECTORIS: Primary | ICD-10-CM

## 2023-01-16 DIAGNOSIS — I48.0 AF (PAROXYSMAL ATRIAL FIBRILLATION): ICD-10-CM

## 2023-01-16 DIAGNOSIS — E78.5 HYPERLIPIDEMIA LDL GOAL <70: ICD-10-CM

## 2023-01-16 DIAGNOSIS — I10 ESSENTIAL HYPERTENSION: ICD-10-CM

## 2023-01-16 PROCEDURE — 99214 OFFICE O/P EST MOD 30 MIN: CPT | Performed by: FAMILY MEDICINE

## 2023-01-16 NOTE — PROGRESS NOTES
Chief Complaint  Hospital Follow Up Visit, Hypertension, and abnormal stress test     Subjective        History of Present Illness  Dav Lu presents to [facility] CARDIOLOGY   Mr. Lu is a 67-year-old male patient coming in for follow-up after [facility].  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  He had been experiencing exertional shortness of breath, and coupled with his testing it was decided to move forward with [facility].  He had 100% chronic total occlusion of proximal right coronary artery, and mid left circumflex artery, both with good collateral filling.  Nonobstructive disease was seen in portions of the LAD.  He was also noted to be in atrial fibrillation during catheterization, and he was initiated on anticoagulation at that time with Eliquis.  Denies any bleeding issues since starting.  He reports he is actually been feeling better since having left heart cath, he states he feels like it eased his mind .  Does describe some very mild shortness of breath with strenuous activity, but not experiencing chest pain or pressure.  Denies any palpitations or racing heart rate.        PMH  Past Medical History:   Diagnosis Date   • Atrial fibrillation (HCC)    • Coronary artery disease    • Hyperlipidemia    • Hypertension          ALLERGY  No Known Allergies       SURGICALHX  Past Surgical History:   Procedure Laterality Date   • CARDIAC CATHETERIZATION N/A 12/13/2022    Procedure: Left Heart Cath with possible angioplasty;  Surgeon: Didier Segura MD;  Location: [facility] INVASIVE LOCATION;  Service: Cardiovascular;  Laterality: N/A;   • CHOLECYSTECTOMY  2014          SOC  Social History     Socioeconomic History   • Marital status:    Tobacco Use   • Smoking status: Never   • Smokeless tobacco: Never   Vaping Use   • Vaping Use: Never used   Substance and Sexual Activity   • Alcohol use: Not Currently   •  "Drug use: Never   • Sexual activity: Defer         FAMHX  Family History   Problem Relation Age of Onset   • Heart disease Father           MEDSIGONLY  Current Outpatient Medications on File Prior to Visit   Medication Sig   • aspirin 81 MG EC tablet Take 1 tablet by mouth Daily.   • atorvastatin (LIPITOR) 10 MG tablet Take 1 tablet by mouth Every Night.   • fenofibrate 160 MG tablet Take 160 mg by mouth Daily.   • levothyroxine (SYNTHROID, LEVOTHROID) 75 MCG tablet Take 75 mcg by mouth Daily.   • metoprolol succinate XL (TOPROL-XL) 25 MG 24 hr tablet Take 25 mg by mouth 2 (Two) Times a Day.   • omeprazole (priLOSEC) 20 MG capsule Take 20 mg by mouth Daily.   • tamsulosin (FLOMAX) 0.4 MG capsule 24 hr capsule Take 1 capsule by mouth Every Night.   • apixaban (ELIQUIS) 5 MG tablet tablet Take 1 tablet by mouth 2 (Two) Times a Day. Start on 12/14/2022     No current facility-administered medications on file prior to visit.       REVIEW OF SYSTEMS  Review of Systems   Constitutional: Positive for fatigue. Negative for activity change and chills.   Respiratory: Negative for cough, chest tightness and shortness of breath.    Cardiovascular: Negative for chest pain, palpitations and leg swelling.   Gastrointestinal: Negative for nausea and vomiting.   Skin: Negative for rash.   Neurological: Negative for dizziness, syncope and light-headedness.   Hematological: Does not bruise/bleed easily.        Objective   Vitals:    01/16/23 0903   BP: 112/87   Pulse: 87   Weight: 108 kg (239 lb)   Height: 180.3 cm (71\")         Physical Exam  Constitutional:       General: He is awake. He is not in acute distress.     Appearance: Normal appearance.   Eyes:      General: No scleral icterus.     Conjunctiva/sclera: Conjunctivae normal.   Neck:      Vascular: No carotid bruit or JVD.   Cardiovascular:      Rate and Rhythm: Normal rate. Rhythm irregular.      Heart sounds: Normal heart sounds, S1 normal and S2 normal. No murmur heard.    " No friction rub. No gallop.   Pulmonary:      Effort: Pulmonary effort is normal.      Breath sounds: Normal breath sounds. No wheezing, rhonchi or rales.   Abdominal:      General: Bowel sounds are normal. There is no distension.      Palpations: Abdomen is soft.   Musculoskeletal:         General: Normal range of motion.      Right lower leg: No edema.      Left lower leg: No edema.   Skin:     General: Skin is warm and dry.   Neurological:      Mental Status: He is alert and oriented to person, place, and time.         Result Review     The following data was reviewed by NOEMI Bates on 01/16/23.      CMP    CMP 12/13/22 12/16/22   Glucose 100 (A) 97   BUN 26 (A) 22   Creatinine 1.38 (A) 1.36 (A)   eGFR 55.7 (A) 56.7 (A)   Sodium 137 137   Potassium 4.4 4.2   Chloride 104 102   Calcium 9.0 9.3   BUN/Creatinine Ratio 18.8 16.2   Anion Gap 9.4 7.0   (A) Abnormal value       Comments are available for some flowsheets but are not being displayed.           CBC w/diff    CBC w/Diff 12/13/22   WBC 4.34   RBC 4.80   Hemoglobin 13.6   Hematocrit 39.9   MCV 83.1   MCH 28.3   MCHC 34.1   RDW 13.9   Platelets 210   Neutrophil Rel % 61.5   Immature Granulocyte Rel % 0.2   Lymphocyte Rel % 27.2   Monocyte Rel % 8.8   Eosinophil Rel % 1.8   Basophil Rel % 0.5                Lipid Panel    Lipid Panel 12/13/22   Total Cholesterol 114   Triglycerides 90   HDL Cholesterol 37 (A)   VLDL Cholesterol 18   LDL Cholesterol  59   LDL/HDL Ratio 1.59   (A) Abnormal value              Results for orders placed in visit on 12/01/22    Adult Transthoracic Echo Complete W/ Cont if Necessary Per Protocol    Interpretation Summary  •  Overall LV ejection fraction is 55%.  There is moderate basal inferior wall hypokinesis  •  Left ventricular wall thickness is consistent with mild concentric hypertrophy.  •  There are no significant valvular abnormalities.  •  Estimated right ventricular systolic pressure from tricuspid regurgitation is  normal (<35 mmHg).    Results for orders placed during the hospital encounter of 08/04/22    Stress test with myocardial perfusion one day    Interpretation Summary  · Abnormal myocardial SPECT perfusion study showing a small to moderate sized inferolateral wall infarct with mild carissa-infarct ischemia.  · Left ventricular ejection fraction is mildly reduced. (Calculated EF = 45%). There is mild inferior wall hypokinesis.  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  · There were no new ischemic EKG changes with regadenoson infusion.  · There was no chest pain with regadenoson infusion.  · Impressions are consistent with an intermediate risk study.    Cardiac catheterization    December 13, 2022  Conclusions:    1.  100% chronic total occlusion of the proximal right coronary artery with collateral filling of the distal RCA from the left system  2.  100% chronic total occlusion of the mid LCx artery with collateral filling from the right coronary system  3.  Nonobstructive coronary disease noted in the left anterior sending artery, which are not flow-limiting  4.  Mildly elevated left ventricular end-diastolic pressure  5.  Patient was noted to be in atrial fibrillation with controlled ventricular rates during the Cardiac catheterization       Assessment and Plan   Diagnoses and all orders for this visit:    1. Coronary artery disease involving native coronary artery of native heart without angina pectoris (Primary)  Assessment & Plan:  Stable without angina.    Continue aspirin, beta-blocker, and statin therapies.      2. Essential hypertension  Assessment & Plan:  Blood pressure well controlled.  Continue current dose of metoprolol.    Orders:  -     Comprehensive Metabolic Panel; Future    3. AF (paroxysmal atrial fibrillation) ([facility])  Assessment & Plan:  Not experiencing any palpitations or racing heart rates.  Initial vital signs indicated heart rate of 87, on exam apically he was 72.   Did encourage him that if he began to have elevated heart rates to let us know and we can consider increasing dose of beta-blocker.  For now continue current dose of beta-blocker, and  anticoagulation with Eliquis.       4. Hyperlipidemia LDL goal <70  Assessment & Plan:  Most recent LDL 59, at goal.  Continue atorvastatin and fenofibrate at current dosing.    Orders:  -     Comprehensive Metabolic Panel; Future  -     Lipid Panel; Future    Other orders  -     apixaban (ELIQUIS) 5 MG tablet tablet; Take 1 tablet by mouth 2 (Two) Times a Day for 90 days. Start on 12/14/2022  Dispense: 180 tablet; Refill: 3            Follow Up   Return in about 6 months (around 7/16/2023) for with Dr. Segura.    Patient was given instructions and counseling regarding his condition or for health maintenance advice. Please see specific information pulled into the AVS if appropriate.     Tiffany Veronica, APRN  01/16/23  08:18 EST    Dictated Utilizing Dragon Dictation

## 2023-01-16 NOTE — ASSESSMENT & PLAN NOTE
Not experiencing any palpitations or racing heart rates.  Initial vital signs indicated heart rate of 87, on exam apically he was 72.  Did encourage him that if he began to have elevated heart rates to let us know and we can consider increasing dose of beta-blocker.  For now continue current dose of beta-blocker, and  anticoagulation with Eliquis.

## 2023-02-28 RX ORDER — ATORVASTATIN CALCIUM 10 MG/1
10 TABLET, FILM COATED ORAL NIGHTLY
Qty: 90 TABLET | Refills: 3 | Status: SHIPPED | OUTPATIENT
Start: 2023-02-28

## 2023-05-17 ENCOUNTER — TRANSCRIBE ORDERS (OUTPATIENT)
Dept: LAB | Facility: HOSPITAL | Age: 68
End: 2023-05-17
Payer: MEDICARE

## 2023-05-17 ENCOUNTER — LAB (OUTPATIENT)
Dept: LAB | Facility: HOSPITAL | Age: 68
End: 2023-05-17
Payer: MEDICARE

## 2023-05-17 DIAGNOSIS — I10 HYPERTENSION, ESSENTIAL: Primary | ICD-10-CM

## 2023-05-17 DIAGNOSIS — E78.2 MIXED HYPERLIPIDEMIA: ICD-10-CM

## 2023-05-17 DIAGNOSIS — I10 HYPERTENSION, ESSENTIAL: ICD-10-CM

## 2023-05-17 LAB
ALBUMIN SERPL-MCNC: 4.5 G/DL (ref 3.5–5.2)
ALBUMIN/GLOB SERPL: 1.4 G/DL
ALP SERPL-CCNC: 51 U/L (ref 39–117)
ALT SERPL W P-5'-P-CCNC: 24 U/L (ref 1–41)
ANION GAP SERPL CALCULATED.3IONS-SCNC: 8 MMOL/L (ref 5–15)
AST SERPL-CCNC: 34 U/L (ref 1–40)
BASOPHILS # BLD AUTO: 0.04 10*3/MM3 (ref 0–0.2)
BASOPHILS NFR BLD AUTO: 0.7 % (ref 0–1.5)
BILIRUB SERPL-MCNC: 0.7 MG/DL (ref 0–1.2)
BUN SERPL-MCNC: 26 MG/DL (ref 8–23)
BUN/CREAT SERPL: 20.2 (ref 7–25)
CALCIUM SPEC-SCNC: 9.6 MG/DL (ref 8.6–10.5)
CHLORIDE SERPL-SCNC: 104 MMOL/L (ref 98–107)
CHOLEST SERPL-MCNC: 112 MG/DL (ref 0–200)
CO2 SERPL-SCNC: 28 MMOL/L (ref 22–29)
CREAT SERPL-MCNC: 1.29 MG/DL (ref 0.76–1.27)
DEPRECATED RDW RBC AUTO: 42.2 FL (ref 37–54)
EGFRCR SERPLBLD CKD-EPI 2021: 60.4 ML/MIN/1.73
EOSINOPHIL # BLD AUTO: 0.08 10*3/MM3 (ref 0–0.4)
EOSINOPHIL NFR BLD AUTO: 1.5 % (ref 0.3–6.2)
ERYTHROCYTE [DISTWIDTH] IN BLOOD BY AUTOMATED COUNT: 14.4 % (ref 12.3–15.4)
GLOBULIN UR ELPH-MCNC: 3.2 GM/DL
GLUCOSE SERPL-MCNC: 98 MG/DL (ref 65–99)
HCT VFR BLD AUTO: 46.6 % (ref 37.5–51)
HDLC SERPL-MCNC: 41 MG/DL (ref 40–60)
HGB BLD-MCNC: 15.6 G/DL (ref 13–17.7)
IMM GRANULOCYTES # BLD AUTO: 0.02 10*3/MM3 (ref 0–0.05)
IMM GRANULOCYTES NFR BLD AUTO: 0.4 % (ref 0–0.5)
LDLC SERPL CALC-MCNC: 56 MG/DL (ref 0–100)
LDLC/HDLC SERPL: 1.4 {RATIO}
LYMPHOCYTES # BLD AUTO: 1.25 10*3/MM3 (ref 0.7–3.1)
LYMPHOCYTES NFR BLD AUTO: 23.4 % (ref 19.6–45.3)
MCH RBC QN AUTO: 27.2 PG (ref 26.6–33)
MCHC RBC AUTO-ENTMCNC: 33.5 G/DL (ref 31.5–35.7)
MCV RBC AUTO: 81.3 FL (ref 79–97)
MONOCYTES # BLD AUTO: 0.53 10*3/MM3 (ref 0.1–0.9)
MONOCYTES NFR BLD AUTO: 9.9 % (ref 5–12)
NEUTROPHILS NFR BLD AUTO: 3.43 10*3/MM3 (ref 1.7–7)
NEUTROPHILS NFR BLD AUTO: 64.1 % (ref 42.7–76)
NRBC BLD AUTO-RTO: 0 /100 WBC (ref 0–0.2)
PLATELET # BLD AUTO: 233 10*3/MM3 (ref 140–450)
PMV BLD AUTO: 11.1 FL (ref 6–12)
POTASSIUM SERPL-SCNC: 4.6 MMOL/L (ref 3.5–5.2)
PROT SERPL-MCNC: 7.7 G/DL (ref 6–8.5)
RBC # BLD AUTO: 5.73 10*6/MM3 (ref 4.14–5.8)
SODIUM SERPL-SCNC: 140 MMOL/L (ref 136–145)
TRIGL SERPL-MCNC: 69 MG/DL (ref 0–150)
TSH SERPL DL<=0.05 MIU/L-ACNC: 3.62 UIU/ML (ref 0.27–4.2)
VLDLC SERPL-MCNC: 15 MG/DL (ref 5–40)
WBC NRBC COR # BLD: 5.35 10*3/MM3 (ref 3.4–10.8)

## 2023-05-17 PROCEDURE — 80061 LIPID PANEL: CPT

## 2023-05-17 PROCEDURE — 36415 COLL VENOUS BLD VENIPUNCTURE: CPT

## 2023-05-17 PROCEDURE — 84443 ASSAY THYROID STIM HORMONE: CPT

## 2023-05-17 PROCEDURE — 80053 COMPREHEN METABOLIC PANEL: CPT

## 2023-05-17 PROCEDURE — 85025 COMPLETE CBC W/AUTO DIFF WBC: CPT

## 2023-07-25 ENCOUNTER — OFFICE VISIT (OUTPATIENT)
Dept: CARDIOLOGY | Facility: CLINIC | Age: 68
End: 2023-07-25
Payer: MEDICARE

## 2023-07-25 VITALS
BODY MASS INDEX: 31.44 KG/M2 | DIASTOLIC BLOOD PRESSURE: 105 MMHG | HEIGHT: 71 IN | SYSTOLIC BLOOD PRESSURE: 141 MMHG | WEIGHT: 224.6 LBS | HEART RATE: 78 BPM

## 2023-07-25 DIAGNOSIS — I48.0 AF (PAROXYSMAL ATRIAL FIBRILLATION): ICD-10-CM

## 2023-07-25 DIAGNOSIS — I25.10 CORONARY ARTERY DISEASE INVOLVING NATIVE CORONARY ARTERY OF NATIVE HEART WITHOUT ANGINA PECTORIS: Primary | ICD-10-CM

## 2023-07-25 DIAGNOSIS — E78.5 HYPERLIPIDEMIA LDL GOAL <70: ICD-10-CM

## 2023-07-25 DIAGNOSIS — I10 ESSENTIAL HYPERTENSION: ICD-10-CM

## 2023-07-25 PROCEDURE — 3080F DIAST BP >= 90 MM HG: CPT | Performed by: INTERNAL MEDICINE

## 2023-07-25 PROCEDURE — 1160F RVW MEDS BY RX/DR IN RCRD: CPT | Performed by: INTERNAL MEDICINE

## 2023-07-25 PROCEDURE — 99214 OFFICE O/P EST MOD 30 MIN: CPT | Performed by: INTERNAL MEDICINE

## 2023-07-25 PROCEDURE — 1159F MED LIST DOCD IN RCRD: CPT | Performed by: INTERNAL MEDICINE

## 2023-07-25 PROCEDURE — 3077F SYST BP >= 140 MM HG: CPT | Performed by: INTERNAL MEDICINE

## 2023-07-25 NOTE — PROGRESS NOTES
CARDIOLOGY FOLLOW-UP PROGRESS NOTE        Chief Complaint  Follow-up, Coronary Artery Disease, and Atrial Fibrillation    Subjective            Dav Lu presents to [facility] CARDIOLOGY  History of Present Illness    Ms Lu is here for a follow-up visit.  He was noted to have coronary artery disease with chronic occlusion of the LAD and the circumflex per cardiac catheterization done last year.  At the time Cardiac catheterization, he was also noted to be in atrial fibrillation with controlled ventricular rates.  Today patient reports feeling fine.  He had episodes of right-sided mild chest discomfort in spring, currently subsided.  Denies any major shortness of breath or palpitations.  No dizziness.  He ran out of refills for Eliquis and currently unable to refill it due to high cost.  Taking all other medications as prescribed.      Past History:    Medical History:  Past Medical History:   Diagnosis Date    Atrial fibrillation     Coronary artery disease     Hyperlipidemia     Hypertension        Surgical History: has a past surgical history that includes Cholecystectomy (2014) and Cardiac catheterization (N/A, 12/13/2022).     Family History: family history includes Heart disease in his father.     Social History: reports that he has never smoked. He has never used smokeless tobacco. He reports that he does not currently use alcohol. He reports that he does not use drugs.    Allergies: Patient has no known allergies.    Current Outpatient Medications on File Prior to Visit   Medication Sig    aspirin 81 MG EC tablet Take 1 tablet by mouth Daily.    atorvastatin (LIPITOR) 10 MG tablet Take 1 tablet by mouth Every Night.    fenofibrate 160 MG tablet Take 1 tablet by mouth Daily.    levothyroxine (SYNTHROID, LEVOTHROID) 75 MCG tablet Take 1 tablet by mouth Daily.    metoprolol tartrate (LOPRESSOR) 25 MG tablet Take 1 tablet by mouth 2 (Two) Times a Day.    omeprazole (priLOSEC) 20 MG  "capsule Take 1 capsule by mouth Daily.    tamsulosin (FLOMAX) 0.4 MG capsule 24 hr capsule Take 1 capsule by mouth Every Night.    apixaban (ELIQUIS) 5 MG tablet tablet Take 1 tablet by mouth 2 (Two) Times a Day.         Review of Systems   Respiratory:  Negative for cough, shortness of breath and wheezing.    Cardiovascular:  Negative for chest pain, palpitations and leg swelling.   Gastrointestinal:  Negative for nausea and vomiting.   Neurological:  Negative for dizziness and syncope.      Objective     BP (!) 141/105   Pulse 78   Ht 180.3 cm (71\")   Wt 102 kg (224 lb 9.6 oz)   BMI 31.33 kg/m²       Physical Exam    General : Alert, awake, no acute distress  Neck : Supple, no carotid bruit, no jugular venous distention  CVS : Irregular, no murmur, rubs or gallops  Lungs: Clear to auscultation bilaterally, no crackles or rhonchi  Abdomen: Soft, nontender, bowel sounds heard in all 4 quadrants  Extremities: Warm, well-perfused, no pedal edema    Result Review :     The following data was reviewed by: Didier Segura MD on 07/25/2023:    CMP          12/13/2022    07:35 12/16/2022    10:15 5/17/2023    10:29   CMP   Glucose 100  97  98    BUN 26  22  26    Creatinine 1.38  1.36  1.29    EGFR 55.7  56.7  60.4    Sodium 137  137  140    Potassium 4.4  4.2  4.6    Chloride 104  102  104    Calcium 9.0  9.3  9.6    Total Protein   7.7    Albumin   4.5    Globulin   3.2    Total Bilirubin   0.7    Alkaline Phosphatase   51    AST (SGOT)   34    ALT (SGPT)   24    Albumin/Globulin Ratio   1.4    BUN/Creatinine Ratio 18.8  16.2  20.2    Anion Gap 9.4  7.0  8.0      CBC          12/13/2022    07:35 5/17/2023    10:29   CBC   WBC 4.34  5.35    RBC 4.80  5.73    Hemoglobin 13.6  15.6    Hematocrit 39.9  46.6    MCV 83.1  81.3    MCH 28.3  27.2    MCHC 34.1  33.5    RDW 13.9  14.4    Platelets 210  233      TSH          5/17/2023    10:29   TSH   TSH 3.620      Lipid Panel          12/13/2022    07:35 5/17/2023    10:29 "   Lipid Panel   Total Cholesterol 114  112    Triglycerides 90  69    HDL Cholesterol 37  41    VLDL Cholesterol 18  15    LDL Cholesterol  59  56    LDL/HDL Ratio 1.59  1.40           Data reviewed: Cardiology studies        Results for orders placed in visit on 12/01/22    Adult Transthoracic Echo Complete W/ Cont if Necessary Per Protocol    Interpretation Summary    Overall LV ejection fraction is 55%.  There is moderate basal inferior wall hypokinesis    Left ventricular wall thickness is consistent with mild concentric hypertrophy.    There are no significant valvular abnormalities.    Estimated right ventricular systolic pressure from tricuspid regurgitation is normal (<35 mmHg).      Results for orders placed during the hospital encounter of 08/04/22    Stress test with myocardial perfusion one day    Interpretation Summary  · Abnormal myocardial SPECT perfusion study showing a small to moderate sized inferolateral wall infarct with mild carissa-infarct ischemia.  · Left ventricular ejection fraction is mildly reduced. (Calculated EF = 45%). There is mild inferior wall hypokinesis.  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  · There were no new ischemic EKG changes with regadenoson infusion.  · There was no chest pain with regadenoson infusion.  · Impressions are consistent with an intermediate risk study.               Assessment and Plan        Diagnoses and all orders for this visit:    1. Coronary artery disease involving native coronary artery of native heart without angina pectoris (Primary)  Assessment & Plan:  He is currently stable without anginal-like symptoms.  Continue aspirin, statin beta-blocker.      2. AF (paroxysmal atrial fibrillation)  Assessment & Plan:  He remains in atrial fibrillation with controlled ventricular rates.  Denies any palpitations.  Findings discussed again with the patient today.  Continue metoprolol tartrate for rate control.  Encouraged him to  restart taking Eliquis for stroke prevention.  Will provide samples today    Orders:  -     apixaban (ELIQUIS) 5 MG tablet tablet; Take 1 tablet by mouth 2 (Two) Times a Day.  Dispense: 56 tablet; Refill: 0    3. Essential hypertension  Assessment & Plan:  Blood pressure on the higher side in the office today.  He has not taken the metoprolol this morning.  He is also going through a lot of stress at this time.  Counseled regarding complaints.  Also counseled regarding low-sodium diet and encouraged to keep home blood pressure log.  We will continue metoprolol for now      4. Hyperlipidemia LDL goal <70  Assessment & Plan:  Most recent LDL is 56, which is at goal.  Continue atorvastatin 10 mg nightly.                Follow Up     Return in about 9 months (around 4/25/2024) for Next scheduled follow up.    Patient was given instructions and counseling regarding his condition or for health maintenance advice. Please see specific information pulled into the AVS if appropriate.

## 2023-07-25 NOTE — ASSESSMENT & PLAN NOTE
Blood pressure on the higher side in the office today.  He has not taken the metoprolol this morning.  He is also going through a lot of stress at this time.  Counseled regarding complaints.  Also counseled regarding low-sodium diet and encouraged to keep home blood pressure log.  We will continue metoprolol for now

## 2023-07-25 NOTE — ASSESSMENT & PLAN NOTE
He remains in atrial fibrillation with controlled ventricular rates.  Denies any palpitations.  Findings discussed again with the patient today.  Continue metoprolol tartrate for rate control.  Encouraged him to restart taking Eliquis for stroke prevention.  Will provide samples today

## 2024-03-27 RX ORDER — APIXABAN 5 MG/1
5 TABLET, FILM COATED ORAL 2 TIMES DAILY
Qty: 180 TABLET | Refills: 1 | Status: SHIPPED | OUTPATIENT
Start: 2024-03-27

## 2024-04-24 ENCOUNTER — OFFICE VISIT (OUTPATIENT)
Dept: CARDIOLOGY | Facility: CLINIC | Age: 69
End: 2024-04-24
Payer: MEDICARE

## 2024-04-24 VITALS
HEART RATE: 79 BPM | HEIGHT: 71 IN | BODY MASS INDEX: 32.9 KG/M2 | WEIGHT: 235 LBS | DIASTOLIC BLOOD PRESSURE: 72 MMHG | SYSTOLIC BLOOD PRESSURE: 109 MMHG

## 2024-04-24 DIAGNOSIS — E78.5 HYPERLIPIDEMIA LDL GOAL <70: ICD-10-CM

## 2024-04-24 DIAGNOSIS — I48.0 AF (PAROXYSMAL ATRIAL FIBRILLATION): ICD-10-CM

## 2024-04-24 DIAGNOSIS — I10 ESSENTIAL HYPERTENSION: ICD-10-CM

## 2024-04-24 DIAGNOSIS — I25.10 CORONARY ARTERY DISEASE INVOLVING NATIVE CORONARY ARTERY OF NATIVE HEART WITHOUT ANGINA PECTORIS: Primary | ICD-10-CM

## 2024-04-24 PROCEDURE — 3074F SYST BP LT 130 MM HG: CPT | Performed by: INTERNAL MEDICINE

## 2024-04-24 PROCEDURE — 1159F MED LIST DOCD IN RCRD: CPT | Performed by: INTERNAL MEDICINE

## 2024-04-24 PROCEDURE — 99214 OFFICE O/P EST MOD 30 MIN: CPT | Performed by: INTERNAL MEDICINE

## 2024-04-24 PROCEDURE — 1160F RVW MEDS BY RX/DR IN RCRD: CPT | Performed by: INTERNAL MEDICINE

## 2024-04-24 PROCEDURE — 3078F DIAST BP <80 MM HG: CPT | Performed by: INTERNAL MEDICINE

## 2024-04-24 RX ORDER — ATORVASTATIN CALCIUM 10 MG/1
10 TABLET, FILM COATED ORAL NIGHTLY
Qty: 90 TABLET | Refills: 3 | Status: SHIPPED | OUTPATIENT
Start: 2024-04-24

## 2024-04-24 NOTE — ASSESSMENT & PLAN NOTE
He is currently in atrial fibrillation with controlled ventricular rates.  Denies any palpitations.  Encouraged to take Lasix 5 mg twice daily.  Samples given today.

## 2024-04-24 NOTE — PROGRESS NOTES
CARDIOLOGY FOLLOW-UP PROGRESS NOTE        Chief Complaint  Coronary Artery Disease and Hyperlipidemia    Subjective            Dav Lu presents to [facility] CARDIOLOGY  History of Present Illness    Mr Lu is here for routine follow-up visit.  He denies any recent episodes of chest pain, shortness of breath or palpitations.  He is taking Eliquis only once daily, most of the days.      Past History:    Coronary artery disease : Cardiac catheterization done on 12/13/2022 showed 100%  proximal RCA and 100%  of mid LCx artery.  Mild nonobstructive lesions in LAD artery    Paroxysmal atrial fibrillation  Mixed hyperlipidemia    Medical History:  Past Medical History:   Diagnosis Date    Atrial fibrillation     Coronary artery disease     Hyperlipidemia     Hypertension        Surgical History: has a past surgical history that includes Cholecystectomy (2014) and Cardiac catheterization (N/A, 12/13/2022).     Family History: family history includes Heart disease in his father.     Social History: reports that he has never smoked. He has never used smokeless tobacco. He reports that he does not currently use alcohol. He reports that he does not use drugs.    Allergies: Patient has no known allergies.    Current Outpatient Medications on File Prior to Visit   Medication Sig    aspirin 81 MG EC tablet Take 1 tablet by mouth Daily.    fenofibrate 160 MG tablet Take 1 tablet by mouth Daily.    levothyroxine (SYNTHROID, LEVOTHROID) 75 MCG tablet Take 1 tablet by mouth Daily.    metoprolol tartrate (LOPRESSOR) 25 MG tablet Take 1 tablet by mouth 2 (Two) Times a Day.    omeprazole (priLOSEC) 20 MG capsule Take 1 capsule by mouth Daily.    tamsulosin (FLOMAX) 0.4 MG capsule 24 hr capsule Take 1 capsule by mouth Every Night.    apixaban (ELIQUIS) 5 MG tablet tablet Take 1 tablet by mouth 1 (One) Time. Due to cost he is only taking one    atorvastatin (LIPITOR) 10 MG tablet Take 1 tablet by mouth  "Every Night.         Review of Systems   Respiratory:  Negative for cough, shortness of breath and wheezing.    Cardiovascular:  Negative for chest pain, palpitations and leg swelling.   Gastrointestinal:  Negative for nausea and vomiting.   Neurological:  Negative for dizziness and syncope.        Objective     /72   Pulse 79   Ht 180.3 cm (71\")   Wt 107 kg (235 lb)   BMI 32.78 kg/m²       Physical Exam    General : Alert, awake, no acute distress  Neck : Supple, no carotid bruit, no jugular venous distention  CVS : Regular rate and rhythm, no murmur, rubs or gallops  Lungs: Clear to auscultation bilaterally, no crackles or rhonchi  Abdomen: Soft, nontender, bowel sounds heard in all 4 quadrants  Extremities: Warm, well-perfused, no pedal edema    Result Review :     The following data was reviewed by: Didier Segura MD on 04/24/2024:    CMP          5/17/2023    10:29   CMP   Glucose 98    BUN 26    Creatinine 1.29    EGFR 60.4    Sodium 140    Potassium 4.6    Chloride 104    Calcium 9.6    Total Protein 7.7    Albumin 4.5    Globulin 3.2    Total Bilirubin 0.7    Alkaline Phosphatase 51    AST (SGOT) 34    ALT (SGPT) 24    Albumin/Globulin Ratio 1.4    BUN/Creatinine Ratio 20.2    Anion Gap 8.0      CBC          5/17/2023    10:29   CBC   WBC 5.35    RBC 5.73    Hemoglobin 15.6    Hematocrit 46.6    MCV 81.3    MCH 27.2    MCHC 33.5    RDW 14.4    Platelets 233      TSH          5/17/2023    10:29   TSH   TSH 3.620      Lipid Panel          5/17/2023    10:29   Lipid Panel   Total Cholesterol 112    Triglycerides 69    HDL Cholesterol 41    VLDL Cholesterol 15    LDL Cholesterol  56    LDL/HDL Ratio 1.40           Data reviewed: Cardiology studies        Results for orders placed in visit on 12/01/22    Adult Transthoracic Echo Complete W/ Cont if Necessary Per Protocol    Interpretation Summary    Overall LV ejection fraction is 55%.  There is moderate basal inferior wall hypokinesis    Left " ventricular wall thickness is consistent with mild concentric hypertrophy.    There are no significant valvular abnormalities.    Estimated right ventricular systolic pressure from tricuspid regurgitation is normal (<35 mmHg).      Results for orders placed during the hospital encounter of 08/04/22    Stress test with myocardial perfusion one day    Interpretation Summary  · Abnormal myocardial SPECT perfusion study showing a small to moderate sized inferolateral wall infarct with mild carissa-infarct ischemia.  · Left ventricular ejection fraction is mildly reduced. (Calculated EF = 45%). There is mild inferior wall hypokinesis.  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  · There were no new ischemic EKG changes with regadenoson infusion.  · There was no chest pain with regadenoson infusion.  · Impressions are consistent with an intermediate risk study.               Assessment and Plan        Diagnoses and all orders for this visit:    1. Coronary artery disease involving native coronary artery of native heart without angina pectoris (Primary)  Assessment & Plan:  He is currently stable with no angina like symptoms.  Recommend to continue aspirin, statin and beta-blocker at the current dose.    Orders:  -     CBC (No Diff); Future    2. Essential hypertension  Assessment & Plan:  Blood pressure reasonably well-controlled.  Continue metoprolol tartrate 20 mg twice daily.      3. AF (paroxysmal atrial fibrillation)  Assessment & Plan:  He is currently in atrial fibrillation with controlled ventricular rates.  Denies any palpitations.  Encouraged to take Lasix 5 mg twice daily.  Samples given today.    Orders:  -     CBC (No Diff); Future    4. Hyperlipidemia LDL goal <70  Assessment & Plan:  No recent lipid panel available.  Previous LDL was 56, at goal.  Continue atorvastatin 10 mg nightly.  Will repeat lipid panel with next lab draw and adjust the dose with medication if  needed.    Orders:  -     atorvastatin (LIPITOR) 10 MG tablet; Take 1 tablet by mouth Every Night.  Dispense: 90 tablet; Refill: 3  -     Comprehensive Metabolic Panel; Future  -     Lipid Panel; Future              Follow Up     Return in about 9 months (around 1/24/2025) for Next scheduled follow up.    Patient was given instructions and counseling regarding his condition or for health maintenance advice. Please see specific information pulled into the AVS if appropriate.

## 2024-04-24 NOTE — ASSESSMENT & PLAN NOTE
He is currently stable with no angina like symptoms.  Recommend to continue aspirin, statin and beta-blocker at the current dose.

## 2024-04-24 NOTE — ASSESSMENT & PLAN NOTE
No recent lipid panel available.  Previous LDL was 56, at goal.  Continue atorvastatin 10 mg nightly.  Will repeat lipid panel with next lab draw and adjust the dose with medication if needed.

## 2024-05-10 ENCOUNTER — TRANSCRIBE ORDERS (OUTPATIENT)
Dept: ADMINISTRATIVE | Facility: HOSPITAL | Age: 69
End: 2024-05-10
Payer: MEDICARE

## 2024-05-10 ENCOUNTER — LAB (OUTPATIENT)
Dept: LAB | Facility: HOSPITAL | Age: 69
End: 2024-05-10
Payer: MEDICARE

## 2024-05-10 DIAGNOSIS — I25.10 CORONARY ARTERY DISEASE INVOLVING NATIVE CORONARY ARTERY OF NATIVE HEART WITHOUT ANGINA PECTORIS: ICD-10-CM

## 2024-05-10 DIAGNOSIS — I10 ESSENTIAL HYPERTENSION, MALIGNANT: Primary | ICD-10-CM

## 2024-05-10 DIAGNOSIS — E03.9 HYPOTHYROIDISM, UNSPECIFIED TYPE: ICD-10-CM

## 2024-05-10 DIAGNOSIS — R97.20 ELEVATED PROSTATE SPECIFIC ANTIGEN (PSA): ICD-10-CM

## 2024-05-10 DIAGNOSIS — E78.2 MIXED HYPERLIPIDEMIA: ICD-10-CM

## 2024-05-10 DIAGNOSIS — I10 ESSENTIAL HYPERTENSION, MALIGNANT: ICD-10-CM

## 2024-05-10 DIAGNOSIS — I48.0 AF (PAROXYSMAL ATRIAL FIBRILLATION): ICD-10-CM

## 2024-05-10 DIAGNOSIS — E78.5 HYPERLIPIDEMIA LDL GOAL <70: ICD-10-CM

## 2024-05-10 LAB
ALBUMIN SERPL-MCNC: 4 G/DL (ref 3.5–5.2)
ALBUMIN/GLOB SERPL: 1.3 G/DL
ALP SERPL-CCNC: 52 U/L (ref 39–117)
ALT SERPL W P-5'-P-CCNC: 15 U/L (ref 1–41)
ANION GAP SERPL CALCULATED.3IONS-SCNC: 10.5 MMOL/L (ref 5–15)
AST SERPL-CCNC: 23 U/L (ref 1–40)
BASOPHILS # BLD AUTO: 0.04 10*3/MM3 (ref 0–0.2)
BASOPHILS NFR BLD AUTO: 0.6 % (ref 0–1.5)
BILIRUB SERPL-MCNC: 0.7 MG/DL (ref 0–1.2)
BUN SERPL-MCNC: 24 MG/DL (ref 8–23)
BUN/CREAT SERPL: 18.5 (ref 7–25)
CALCIUM SPEC-SCNC: 9.4 MG/DL (ref 8.6–10.5)
CHLORIDE SERPL-SCNC: 104 MMOL/L (ref 98–107)
CHOLEST SERPL-MCNC: 106 MG/DL (ref 0–200)
CO2 SERPL-SCNC: 24.5 MMOL/L (ref 22–29)
CREAT SERPL-MCNC: 1.3 MG/DL (ref 0.76–1.27)
DEPRECATED RDW RBC AUTO: 43.6 FL (ref 37–54)
EGFRCR SERPLBLD CKD-EPI 2021: 59.5 ML/MIN/1.73
EOSINOPHIL # BLD AUTO: 0.12 10*3/MM3 (ref 0–0.4)
EOSINOPHIL NFR BLD AUTO: 1.9 % (ref 0.3–6.2)
ERYTHROCYTE [DISTWIDTH] IN BLOOD BY AUTOMATED COUNT: 14.5 % (ref 12.3–15.4)
GLOBULIN UR ELPH-MCNC: 3.2 GM/DL
GLUCOSE SERPL-MCNC: 91 MG/DL (ref 65–99)
HCT VFR BLD AUTO: 44.1 % (ref 37.5–51)
HDLC SERPL-MCNC: 38 MG/DL (ref 40–60)
HGB BLD-MCNC: 14.3 G/DL (ref 13–17.7)
IMM GRANULOCYTES # BLD AUTO: 0.01 10*3/MM3 (ref 0–0.05)
IMM GRANULOCYTES NFR BLD AUTO: 0.2 % (ref 0–0.5)
LDLC SERPL CALC-MCNC: 52 MG/DL (ref 0–100)
LDLC/HDLC SERPL: 1.37 {RATIO}
LYMPHOCYTES # BLD AUTO: 1.39 10*3/MM3 (ref 0.7–3.1)
LYMPHOCYTES NFR BLD AUTO: 21.8 % (ref 19.6–45.3)
MCH RBC QN AUTO: 27.5 PG (ref 26.6–33)
MCHC RBC AUTO-ENTMCNC: 32.4 G/DL (ref 31.5–35.7)
MCV RBC AUTO: 84.8 FL (ref 79–97)
MONOCYTES # BLD AUTO: 0.41 10*3/MM3 (ref 0.1–0.9)
MONOCYTES NFR BLD AUTO: 6.4 % (ref 5–12)
NEUTROPHILS NFR BLD AUTO: 4.41 10*3/MM3 (ref 1.7–7)
NEUTROPHILS NFR BLD AUTO: 69.1 % (ref 42.7–76)
NRBC BLD AUTO-RTO: 0 /100 WBC (ref 0–0.2)
PLATELET # BLD AUTO: 263 10*3/MM3 (ref 140–450)
PMV BLD AUTO: 10.6 FL (ref 6–12)
POTASSIUM SERPL-SCNC: 4.6 MMOL/L (ref 3.5–5.2)
PROT SERPL-MCNC: 7.2 G/DL (ref 6–8.5)
PSA SERPL-MCNC: 5.68 NG/ML (ref 0–4)
RBC # BLD AUTO: 5.2 10*6/MM3 (ref 4.14–5.8)
SODIUM SERPL-SCNC: 139 MMOL/L (ref 136–145)
T4 FREE SERPL-MCNC: 1.18 NG/DL (ref 0.93–1.7)
TRIGL SERPL-MCNC: 79 MG/DL (ref 0–150)
TSH SERPL DL<=0.05 MIU/L-ACNC: 3.97 UIU/ML (ref 0.27–4.2)
VLDLC SERPL-MCNC: 16 MG/DL (ref 5–40)
WBC NRBC COR # BLD AUTO: 6.38 10*3/MM3 (ref 3.4–10.8)

## 2024-05-10 PROCEDURE — 80061 LIPID PANEL: CPT

## 2024-05-10 PROCEDURE — 84439 ASSAY OF FREE THYROXINE: CPT

## 2024-05-10 PROCEDURE — 84443 ASSAY THYROID STIM HORMONE: CPT

## 2024-05-10 PROCEDURE — 85025 COMPLETE CBC W/AUTO DIFF WBC: CPT

## 2024-05-10 PROCEDURE — 84153 ASSAY OF PSA TOTAL: CPT

## 2024-05-10 PROCEDURE — 80053 COMPREHEN METABOLIC PANEL: CPT

## 2024-05-10 PROCEDURE — 36415 COLL VENOUS BLD VENIPUNCTURE: CPT

## 2024-05-13 NOTE — PROGRESS NOTES
Labs done on 5/10/2024 showed normal cholesterol levels.  LDL, bad cholesterol is 52 which is at goal.  Kidney functions are at his baseline.  Sodium, potassium and liver enzymes are within normal limits.      Electronically signed by Didier Segura MD, 05/13/24, 12:55 PM EDT.

## 2024-08-27 DIAGNOSIS — I48.0 AF (PAROXYSMAL ATRIAL FIBRILLATION): ICD-10-CM

## 2024-08-27 NOTE — TELEPHONE ENCOUNTER
Caller: [facility] [address] [facility] [phone] [place] [phone] FX    Relationship: Pharmacy    Requested Prescriptions:   Requested Prescriptions     Pending Prescriptions Disp Refills    apixaban (ELIQUIS) 5 MG tablet tablet 56 tablet 0     Sig: Take 1 tablet by mouth 2 (Two) Times a Day.        Pharmacy where request should be sent: [facility] - [place], [place] - [address] [phone] [place] [phone] FX     Last office visit with prescribing clinician: 4/24/2024   Last telemedicine visit with prescribing clinician: Visit date not found   Next office visit with prescribing clinician: 1/29/2025     Additional details provided by patient: 30 DAY SUPPLY    Does the patient have less than a 3 day supply:  [] Yes  [x] No      Candelaria Hays Rep   08/27/24 13:37 EDT

## 2024-10-28 ENCOUNTER — TELEPHONE (OUTPATIENT)
Dept: UROLOGY | Age: 69
End: 2024-10-28
Payer: MEDICARE

## 2024-10-28 NOTE — TELEPHONE ENCOUNTER
Provider: SAMUEL KELLY    Caller: [facility]  CALL BACK #[phone]    Relationship to Patient: PROVIDER    Reason for Call: DR MOLINA SAW THIS PATIENT IN THE OFFICE TODAY. PATIENT STILL HAVING A LOT OF BLOOD IN HIS URINE, THEY WERE ASKING TO MAKE THIS A STAT APPOINTMENT. THEY ARE FAXING HIS OFFICE NOTE FROM TODAY TO OUR OFFICE FOR REVIEW. PATIENT IS NOT SCHEDULED UNTIL  DECEMBER 11TH.     When was the patient last seen: NEW PATIENT

## 2024-10-28 NOTE — TELEPHONE ENCOUNTER
Will await records last ua with no blood this was never sent to my clinic to review before scheduling with myself.  Also noted to have an elevated PSA with no PSA in the chart.  Need PSA from PCP office. Hub ok to relay.  I have nothing any sooner than 4 November at 1130 however we are trying to keep that schedule light because we are moving into a new space.  If the patient is having that much blood in his urine and his PCP is concerned the patient needs to present to the emergency department for further evaluation.  The patient also should have imaging done if he cannot get into us sooner. Ie ct scan abd pel w/w/o

## 2024-10-29 NOTE — TELEPHONE ENCOUNTER
Called Jessie back from [facility] office and explained that we need the patients records and he will need a repeat PSA, UA with Micro and if he is having blood in the urine then a CT abdomen pelvis is recommended. She voiced her understandings and will let the patient know. I explained the patient has an appointment with kelsey on 12/11 and if he has any trouble to go to the ER.

## 2024-10-30 ENCOUNTER — TELEPHONE (OUTPATIENT)
Dept: UROLOGY | Age: 69
End: 2024-10-30
Payer: MEDICARE

## 2024-10-30 ENCOUNTER — TELEPHONE (OUTPATIENT)
Dept: SURGERY | Facility: CLINIC | Age: 69
End: 2024-10-30
Payer: MEDICARE

## 2024-10-30 NOTE — TELEPHONE ENCOUNTER
PT HAS AN APPT WITH SAMUEL ON 12/11/24, WE RECEIVE ANOTHER REFERRAL FROM [facility]ALIN[facility]OL[facility] FOR A SOONER APPT, PLEASE ADVISE IF THIS NEEDS TO BE MOVED SOONER AND ADVISE WHERE?

## 2024-10-30 NOTE — TELEPHONE ENCOUNTER
Received 2nd referral from Dr. Lyn for gross hematuria and requesting a sooner appointment for this patient. Indexed 2nd referral & office notes. Does this patient need to be seen sooner than 12/11/2024?

## 2024-11-04 ENCOUNTER — OFFICE VISIT (OUTPATIENT)
Dept: UROLOGY | Age: 69
End: 2024-11-04
Payer: MEDICARE

## 2024-11-04 VITALS — HEART RATE: 88 BPM | WEIGHT: 235 LBS | BODY MASS INDEX: 32.9 KG/M2 | RESPIRATION RATE: 18 BRPM | HEIGHT: 71 IN

## 2024-11-04 DIAGNOSIS — R33.9 INCOMPLETE BLADDER EMPTYING: Primary | ICD-10-CM

## 2024-11-04 DIAGNOSIS — R97.20 ELEVATED PROSTATE SPECIFIC ANTIGEN (PSA): ICD-10-CM

## 2024-11-04 DIAGNOSIS — R31.0 GROSS HEMATURIA: ICD-10-CM

## 2024-11-04 PROBLEM — I20.9 ANGINA PECTORIS: Status: RESOLVED | Noted: 2022-12-02 | Resolved: 2024-11-04

## 2024-11-04 PROBLEM — K85.90 PANCREATITIS: Status: ACTIVE | Noted: 2024-11-04

## 2024-11-04 PROBLEM — R06.02 SOB (SHORTNESS OF BREATH): Status: RESOLVED | Noted: 2022-11-30 | Resolved: 2024-11-04

## 2024-11-04 PROBLEM — R94.39 ABNORMAL NUCLEAR STRESS TEST: Status: RESOLVED | Noted: 2022-11-30 | Resolved: 2024-11-04

## 2024-11-04 PROBLEM — K85.90 PANCREATITIS: Status: RESOLVED | Noted: 2024-11-04 | Resolved: 2024-11-04

## 2024-11-04 PROBLEM — N40.0 BPH (BENIGN PROSTATIC HYPERPLASIA): Status: ACTIVE | Noted: 2024-11-04

## 2024-11-04 PROBLEM — M25.571 ACUTE RIGHT ANKLE PAIN: Status: RESOLVED | Noted: 2017-07-18 | Resolved: 2024-11-04

## 2024-11-04 PROBLEM — C32.9 LARYNX CANCER: Status: ACTIVE | Noted: 2024-11-04

## 2024-11-04 PROBLEM — K21.9 ESOPHAGEAL REFLUX: Status: ACTIVE | Noted: 2024-11-04

## 2024-11-04 LAB
BILIRUB BLD-MCNC: NEGATIVE MG/DL
CLARITY, POC: CLEAR
COLOR UR: YELLOW
EXPIRATION DATE: NORMAL
GLUCOSE UR STRIP-MCNC: NEGATIVE MG/DL
KETONES UR QL: NEGATIVE
LEUKOCYTE EST, POC: NEGATIVE
Lab: NORMAL
NITRITE UR-MCNC: NEGATIVE MG/ML
PH UR: 6.5 [PH] (ref 5–8)
PROT UR STRIP-MCNC: NEGATIVE MG/DL
RBC # UR STRIP: NEGATIVE /UL
SP GR UR: 1.01 (ref 1–1.03)
URINE VOLUME: 207
UROBILINOGEN UR QL: NORMAL

## 2024-11-04 PROCEDURE — 99214 OFFICE O/P EST MOD 30 MIN: CPT | Performed by: NURSE PRACTITIONER

## 2024-11-04 PROCEDURE — 51798 US URINE CAPACITY MEASURE: CPT | Performed by: NURSE PRACTITIONER

## 2024-11-04 PROCEDURE — 81003 URINALYSIS AUTO W/O SCOPE: CPT | Performed by: NURSE PRACTITIONER

## 2024-11-04 PROCEDURE — 1159F MED LIST DOCD IN RCRD: CPT | Performed by: NURSE PRACTITIONER

## 2024-11-04 PROCEDURE — 1160F RVW MEDS BY RX/DR IN RCRD: CPT | Performed by: NURSE PRACTITIONER

## 2024-11-04 RX ORDER — TAMSULOSIN HYDROCHLORIDE 0.4 MG/1
2 CAPSULE ORAL NIGHTLY
Qty: 180 CAPSULE | Refills: 3 | Status: SHIPPED | OUTPATIENT
Start: 2024-11-04

## 2024-11-04 RX ORDER — DOXYCYCLINE 100 MG/1
100 CAPSULE ORAL 2 TIMES DAILY
Qty: 56 CAPSULE | Refills: 0 | Status: SHIPPED | OUTPATIENT
Start: 2024-11-04 | End: 2024-12-02

## 2024-11-04 NOTE — PROGRESS NOTES
"Chief Complaint: Follow-up and Blood in Urine    Subjective         History of Present Illness  Dav Lu is a 69 y.o. male presents to [facility] UROLOGY to be seen for gross hematuria.    Patient initially referred to us back on 10/8/2024 for complaints of gross hematuria and elevated PSA.    Initially the labs that were sent did not reveal any blood in the patient's urine thus he was scheduled with myself for December 11, 2024.    The patient's PCP called the office on 10/28/2024 and stated that the patient was \"still having a lot of blood in his urine and requesting a stat appointment\"    We had requested more records from the patient's PCP office and we did receive records from 10/30/2024 which included a PSA from that date of service that was noted to be 7.61 and a urinalysis with microscopy that revealed greater than 100 red blood cells per high-powered field    The patient was previously seen by Dr. Flora Nelson in 2021 for elevated PSA and he had a TRUS biopsy of the prostate 5/14/2019 that revealed benign tissue with some inflammation.  The patient had complained of nocturia and slow flow as well as some urgency to the point of leakage on occasion.    The patient was noted to be on tamsulosin at that point in time was recommended to follow-up in 6 months with a repeat PSA however that appears to have been during the COVID-19 pandemic outbreak and the patient was lost to follow-up.    He states that x 3 over the last month he has had a \"couple of drops of blood in the urine\" this would be for a few urination and resolve.     He states that last week he had red smoky urine for 5 days straight and he passed clots.    He states over the last week he has had off and on blood in the urine.    He has no blood in the urine today.     PVR is 207.    He was given bactrim from his PCP but cannot take these due to the size of the tablet.     His father had prostate cancer.     He smoked for " "10 years  1.5 ppd quit over 40 years ago.      Objective     Past Medical History:   Diagnosis Date    Atrial fibrillation     Coronary artery disease     Hyperlipidemia     Hypertension        Past Surgical History:   Procedure Laterality Date    CARDIAC CATHETERIZATION N/A 12/13/2022    Procedure: Left Heart Cath with possible angioplasty;  Surgeon: Didier Segura MD;  Location: [facility] CATH INVASIVE LOCATION;  Service: Cardiovascular;  Laterality: N/A;    CHOLECYSTECTOMY  2014         Current Outpatient Medications:     apixaban (ELIQUIS) 5 MG tablet tablet, Take 1 tablet by mouth 2 (Two) Times a Day., Disp: 60 tablet, Rfl: 2    aspirin 81 MG EC tablet, Take 1 tablet by mouth Daily., Disp: 30 tablet, Rfl: 1    atorvastatin (LIPITOR) 10 MG tablet, Take 1 tablet by mouth Every Night., Disp: 90 tablet, Rfl: 3    fenofibrate 160 MG tablet, Take 1 tablet by mouth Daily., Disp: , Rfl:     levothyroxine (SYNTHROID, LEVOTHROID) 75 MCG tablet, Take 1 tablet by mouth Daily., Disp: , Rfl:     metoprolol tartrate (LOPRESSOR) 25 MG tablet, Take 1 tablet by mouth 2 (Two) Times a Day., Disp: , Rfl:     omeprazole (priLOSEC) 20 MG capsule, Take 1 capsule by mouth Daily., Disp: , Rfl:     tamsulosin (FLOMAX) 0.4 MG capsule 24 hr capsule, Take 2 capsules by mouth Every Night., Disp: 180 capsule, Rfl: 3    doxycycline (VIBRAMYCIN) 100 MG capsule, Take 1 capsule by mouth 2 (Two) Times a Day for 28 days., Disp: 56 capsule, Rfl: 0    No Known Allergies     Family History   Problem Relation Age of Onset    Heart disease Father        Social History     Socioeconomic History    Marital status:    Tobacco Use    Smoking status: Never    Smokeless tobacco: Never   Vaping Use    Vaping status: Never Used   Substance and Sexual Activity    Alcohol use: Not Currently    Drug use: Never    Sexual activity: Defer       Vital Signs:   Pulse 88   Resp 18   Ht 180.3 cm (71\")   Wt 107 kg (235 lb)   BMI 32.78 kg/m²      Physical Exam "     Result Review :   The following data was reviewed by: NOEMI Dutta on 11/04/2024:  Results for orders placed or performed in visit on 11/04/24   POC Urinalysis Dipstick, Automated    Collection Time: 11/04/24 11:43 AM    Specimen: Urine   Result Value Ref Range    Color Yellow Yellow, Straw, Dark Yellow, [place]    Clarity, UA Clear Clear    Specific Gravity  1.015 1.005 - 1.030    pH, Urine 6.5 5.0 - 8.0    Leukocytes Negative Negative    Nitrite, UA Negative Negative    Protein, POC Negative Negative mg/dL    Glucose, UA Negative Negative mg/dL    Ketones, UA Negative Negative    Urobilinogen, UA 0.2 E.U./dL Normal, 0.2 E.U./dL    Bilirubin Negative Negative    Blood, UA Negative Negative    Lot Number [id number]     Expiration Date 11/4/2024    Bladder Scan    Collection Time: 11/04/24  1:00 PM   Result Value Ref Range    Urine Volume 207       PSA          5/10/2024    09:24   PSA   PSA 5.680      Bladder Scan interpretation 11/04/2024    Estimation of residual urine via [employer] Bladder Scan  MA/nurse performing: ana rosa cameron Ma   Residual Urine: 207 ml  Indication: Incomplete bladder emptying    Gross hematuria    Elevated prostate specific antigen (PSA)   Position: Supine  Examination: Incremental scanning of the suprapubic area using 2.0 MHz transducer using copious amounts of acoustic gel.   Findings: An anechoic area was demonstrated which represented the bladder, with measurement of residual urine as noted. I inspected this myself. In that the residual urine was stable or insignificant, refer to plan for treatment and plan necessary at this time.          Procedures        Assessment and Plan    Diagnoses and all orders for this visit:    1. Incomplete bladder emptying (Primary)  -     POC Urinalysis Dipstick, Automated  -     Bladder Scan  -     tamsulosin (FLOMAX) 0.4 MG capsule 24 hr capsule; Take 2 capsules by mouth Every Night.  Dispense: 180 capsule; Refill: 3    2. Gross  hematuria  -     Cancel: CT Abdomen Pelvis With & Without Contrast; Future  -     CT Abdomen Pelvis With & Without Contrast; Future    3. Elevated prostate specific antigen (PSA)  -     Cystoscopy; Future  -     MRI Prostate With & Without Contrast; Future  -     doxycycline (VIBRAMYCIN) 100 MG capsule; Take 1 capsule by mouth 2 (Two) Times a Day for 28 days.  Dispense: 56 capsule; Refill: 0      In regards to patient's gross hematuria we will go ahead and order upper and lower urinary tract evaluation in the form of a CT abdomen pelvis with and without contrast with delayed imaging as well as a cystoscopy to be performed within the next few weeks.    He does understand that we are ruling out a malignancy at this point in time and failure to complete workup may result in missed malignancy or possibly progression of disease.    Will go ahead and treat him for prostatitis with doxycycline as he was not able to take the Bactrim as prescribed by his PCP due to the size of the tablets.    Given elevated PSA we will proceed with MRI of the prostate to delineate underlying etiology of elevated PSA and provide mapping for fusion biopsy.          I spent 15 minutes caring for Dav on this date of service. This time includes time spent by me in the following activities:reviewing tests, obtaining and/or reviewing a separately obtained history, performing a medically appropriate examination and/or evaluation , counseling and educating the patient/family/caregiver, ordering medications, tests, or procedures, and documenting information in the medical record  Follow Up   Return for With Dr. Moore for Cystoscopy 3 months with me for elevated psa.  Patient was given instructions and counseling regarding his condition or for health maintenance advice. Please see specific information pulled into the AVS if appropriate.         This document has been electronically signed by NOEMI Dutta  November 4, 2024 13:01 EST

## 2024-11-20 ENCOUNTER — PROCEDURE VISIT (OUTPATIENT)
Dept: UROLOGY | Age: 69
End: 2024-11-20
Payer: MEDICARE

## 2024-11-20 VITALS — BODY MASS INDEX: 32.9 KG/M2 | WEIGHT: 235 LBS | HEIGHT: 71 IN

## 2024-11-20 DIAGNOSIS — R97.20 ELEVATED PROSTATE SPECIFIC ANTIGEN (PSA): ICD-10-CM

## 2024-11-20 DIAGNOSIS — N52.9 ERECTILE DYSFUNCTION, UNSPECIFIED ERECTILE DYSFUNCTION TYPE: ICD-10-CM

## 2024-11-20 DIAGNOSIS — N40.1 BENIGN PROSTATIC HYPERPLASIA WITH WEAK URINARY STREAM: ICD-10-CM

## 2024-11-20 DIAGNOSIS — R33.9 INCOMPLETE BLADDER EMPTYING: ICD-10-CM

## 2024-11-20 DIAGNOSIS — R39.12 BENIGN PROSTATIC HYPERPLASIA WITH WEAK URINARY STREAM: ICD-10-CM

## 2024-11-20 DIAGNOSIS — R31.0 GROSS HEMATURIA: Primary | ICD-10-CM

## 2024-11-20 DIAGNOSIS — R39.15 URGENCY OF URINATION: ICD-10-CM

## 2024-11-20 RX ORDER — TADALAFIL 5 MG/1
5 TABLET ORAL DAILY
Qty: 90 TABLET | Refills: 0 | Status: SHIPPED | OUTPATIENT
Start: 2024-11-20

## 2024-11-20 RX ORDER — OXYBUTYNIN CHLORIDE 5 MG/1
5 TABLET ORAL 3 TIMES DAILY PRN
Qty: 60 TABLET | Refills: 1 | Status: SHIPPED | OUTPATIENT
Start: 2024-11-20

## 2024-11-20 NOTE — PROGRESS NOTES
"    UROLOGY OFFICE FOLLOW UP NOTE    Subjective   HPI  Dav Lu is a 69 y.o. male.  Known history of BPH with lower urinary tract symptoms, urinary urgency, ED most recently with gross hematuria.  Also with elevated PSA; MRI of the prostate pending.    Presents for lower urinary tract evaluation.  Patient states for 5-day period of time he had gross hematuria,, bloody drips out of the meatus.  Denies associated pain.  On Flomax; refractory symptoms of urgency, lower urinary tract symptoms despite this.  Notes that he will do better, go less often if he takes an occasional on-demand Cialis.  Not currently sexually active.        History of Present Illness      Review of systems  A review of systems was performed, and positive findings are noted in the HPI.    Objective     Vital Signs:   Ht 180.3 cm (71\")   Wt 107 kg (235 lb)   BMI 32.78 kg/m²       Physical exam  No acute distress, well-nourished  Awake alert and oriented  Mood normal; affect normal  Physical Exam      Cystoscopy  Preprocedure diagnosis  Gross hematuria    Postprocedure diagnosis  Same as above    Procedure  Flexible Cystourethroscopy    Attending surgeon  Flora Moore MD    Anesthesia  2% lidocaine jelly intraurethrally    Complications  None    Indications  69 y.o. male undergoing a flexible cystoscopy for the above mentioned indications.  Seen by urology ARNP, Rena Raza, presents for lower urinary tract evaluation.  Also seen for elevated PSA, MRI prostate pending.  Informed consent was obtained.      CT urogram performed prior to today's visit without identifiable etiology for his hematuria.    Findings  Patent meatus; significant prostatomegaly with elongated prostatic urethra, varicosities, bilateral coapting lateral lobes; large median lobe; cystoscopy revealed one right and left ureteral orifice in the normal anatomic position, normal bladder mucosa and no tumors, masses or stones.  Mild trabeculations, no significant " diverticula.    Procedure  The patient was placed in supine position and prepped and draped in sterile fashion with lidocaine jelly per urethra for anesthesia.  A timeout was performed.  The 14F flexible cystoscope was lubricated and gently placed through the urethra and into the bladder.  The bladder was completely visualized.  The cystoscope was retroflexed and the bladder neck visualized. Findings were as above. The scope was withdrawn and the procedure terminated.  The patient tolerated the procedure well.        Problem List:  Patient Active Problem List   Diagnosis    Essential hypertension    Coronary artery disease involving native coronary artery of native heart without angina pectoris    AF (paroxysmal atrial fibrillation)    Hyperlipidemia LDL goal <70    BPH (benign prostatic hyperplasia)    Elevated PSA    Esophageal reflux    Larynx cancer       Assessment & Plan   Diagnoses and all orders for this visit:    1. Gross hematuria (Primary)    2. Incomplete bladder emptying    3. Elevated prostate specific antigen (PSA)  -     Cystoscopy    4. Benign prostatic hyperplasia with weak urinary stream  -     tadalafil (Cialis) 5 MG tablet; Take 1 tablet by mouth Daily.  Dispense: 90 tablet; Refill: 0    5. Erectile dysfunction, unspecified erectile dysfunction type  -     tadalafil (Cialis) 5 MG tablet; Take 1 tablet by mouth Daily.  Dispense: 90 tablet; Refill: 0    6. Urgency of urination  -     oxybutynin (DITROPAN) 5 MG tablet; Take 1 tablet by mouth 3 (Three) Times a Day As Needed (urinary urgency, bladder spasms).  Dispense: 60 tablet; Refill: 1      Cystoscopy findings discussed with patient at length; bladder with mild trabeculations, otherwise normal bladder mucosa; large median lobe with elongated prostatic urethra, bilateral coapting lateral lobes and varicosities; almost certainly etiology for his gross hematuria    Patient previously on oxybutynin as needed for urinary urgency; states he has a  couple but they are very old.  Request refill.  States it helped before.  Refill sent to pharmacy    Recommend combination medical management of his BPH with lower urinary tract symptoms.  Options discussed including possibility of starting finasteride particularly given varicosities.  Discussed side effect profile.  Other option of low-dose daily Cialis.  Patient notes some relief of his urinary symptoms with on-demand Cialis.  Willing to try every day low-dose daily Cialis.  Side effects discussed including dizziness lightheadedness and facial flushing.  Prescription sent to pharmacy  Assessment & Plan       Obtain prostate MRI      Patient encouraged to follow-up 1 month, PVR with MRI prostate prior  All questions addressed      Patient or patient representative verbalized consent for the use of Ambient Listening during the visit with  Flora Moore MD for chart documentation. 11/20/2024  15:04 EST

## 2024-12-27 ENCOUNTER — TELEPHONE (OUTPATIENT)
Dept: UROLOGY | Age: 69
End: 2024-12-27
Payer: MEDICARE

## 2024-12-27 NOTE — TELEPHONE ENCOUNTER
STALIN for the patient in regards to his MRI results. I did message Hui to get some Biopsy dates since he has been seen by Oscar. HUB okay to relay. I will call again once I have a few dates.

## 2024-12-27 NOTE — TELEPHONE ENCOUNTER
----- Message from Rena Scanlon sent at 12/27/2024 10:26 AM EST -----  Regarding: FW:  Can we let the patient know that he does need a biopsy he has two pirads 5 lesions (the most abnormal looking lesion) I am unable to see if he has seen one of the other providers before. If he has seen any of the urologist, we can get a few dates together for him. I can try to get on the computer today to put in orders if need be  ----- Message -----  From: Shruti Mercedes Incoming  Sent: 12/23/2024  11:29 AM EST  To: NOEMI Dutta

## 2024-12-31 ENCOUNTER — TELEPHONE (OUTPATIENT)
Dept: CARDIOLOGY | Facility: CLINIC | Age: 69
End: 2024-12-31
Payer: MEDICARE

## 2024-12-31 ENCOUNTER — PREP FOR SURGERY (OUTPATIENT)
Dept: OTHER | Facility: HOSPITAL | Age: 69
End: 2024-12-31
Payer: MEDICARE

## 2024-12-31 DIAGNOSIS — R97.20 ELEVATED PROSTATE SPECIFIC ANTIGEN (PSA): Primary | ICD-10-CM

## 2024-12-31 RX ORDER — SODIUM CHLORIDE 0.9 % (FLUSH) 0.9 %
3 SYRINGE (ML) INJECTION EVERY 12 HOURS SCHEDULED
OUTPATIENT
Start: 2024-12-31

## 2024-12-31 RX ORDER — SODIUM CHLORIDE 9 MG/ML
100 INJECTION, SOLUTION INTRAVENOUS ONCE
OUTPATIENT
Start: 2024-12-31

## 2024-12-31 RX ORDER — SODIUM CHLORIDE 0.9 % (FLUSH) 0.9 %
10 SYRINGE (ML) INJECTION AS NEEDED
OUTPATIENT
Start: 2024-12-31

## 2024-12-31 RX ORDER — SODIUM CHLORIDE 9 MG/ML
40 INJECTION, SOLUTION INTRAVENOUS AS NEEDED
OUTPATIENT
Start: 2024-12-31

## 2024-12-31 NOTE — TELEPHONE ENCOUNTER
----- Message from Tiffany Veronica sent at 12/31/2024  4:01 PM EST -----    ----- Message -----  From: Gladis Machado RN  Sent: 12/31/2024   1:26 PM EST  To: Didier Segura MD

## 2024-12-31 NOTE — TELEPHONE ENCOUNTER
He may proceed with upcoming procedure at moderate risk, and may hold Eliquis for 2 days prior to procedure, and aspirin up to 7 days prior to procedure.

## 2024-12-31 NOTE — TELEPHONE ENCOUNTER
Procedure:PROSTATE ULTRASOUND BIOPSY MRI FUSION WITH URONAV     Medication Directive: Eliquis and aspirin    PMH: CAD, HTN, PAF, HLD    Last Seen: 04/24/24

## 2025-01-02 NOTE — TELEPHONE ENCOUNTER
Called patient and expalined that we can cancel his appointment with Dr. Wilkins since he is having surgery on the 13th and then they will schedule him after that for a follow up. Also I relayed that he will need to hold his Eliquis 2 days prior to surgery and Asprin 7 days prior. Patient voiced understandings.

## 2025-01-09 RX ORDER — CETIRIZINE HYDROCHLORIDE 10 MG/1
10 TABLET ORAL DAILY
COMMUNITY

## 2025-01-09 NOTE — PRE-PROCEDURE INSTRUCTIONS
IMPORTANT INSTRUCTIONS - PRE-ADMISSION TESTING  DO NOT EAT, DRINK OR CHEW anything after midnight the night before your procedure.    Take the following medications the morning of your procedure with JUST A SIP OF WATER: ZYRTEC, FENOFIBRATE, METOPROLOL, CIALIS,TAMSULOSIN, LEVOTHYROXINE, OMEPRAZOLE, OXYBUTYNIN IF NEEDED  HOLD ASPIRIN 7 DAYS PRIOR TO PROCEDURE  HOLD ELIQUIS 2 DAYS PRIOR TO PROCEDURE, LAST DOSE 1/11/25 AM    DO NOT BRING your medications to the hospital with you, UNLESS something has changed since your PRE-Admission Testing appointment.  Hold all vitamins, supplements, and NSAIDS (Non- steroidal anti-inflammatory meds) for one week prior to surgery (you MAY take Tylenol or Acetaminophen).  If you are diabetic, check your blood sugar the morning of your procedure. If it is less than 70 or if you are feeling symptomatic, call the following number for further instructions: [phone] SAME DAY SURGERY_.  Use your inhalers/nebulizers as usual, the morning of your procedure. BRING YOUR INHALERS with you.   Bring your CPAP or BIPAP to hospital, ONLY IF YOU WILL BE SPENDING THE NIGHT.   Make sure you have a ride home and have someone who will stay with you the day of your procedure after you go home.  If you have any questions, please call your Pre-Admission Testing NurseTROY_ at [phone]_.   Per anesthesia request, do not smoke for 24 hours before your procedure or as instructed by your surgeon.  SHOWER WITH ANTIBACTERIAL SOAP FOLLOWED BY NOTHING ON SKIN SUCH AS CREAM, LOTION, AFTERSHAVE JEWELRY OR DEODORANT.

## 2025-01-10 ENCOUNTER — ANESTHESIA EVENT (OUTPATIENT)
Dept: PERIOP | Facility: HOSPITAL | Age: 70
End: 2025-01-10
Payer: MEDICARE

## 2025-01-13 ENCOUNTER — HOSPITAL ENCOUNTER (OUTPATIENT)
Facility: HOSPITAL | Age: 70
Setting detail: HOSPITAL OUTPATIENT SURGERY
Discharge: HOME | End: 2025-01-13
Attending: UROLOGY | Admitting: UROLOGY
Payer: MEDICARE

## 2025-01-13 ENCOUNTER — ANESTHESIA (OUTPATIENT)
Dept: PERIOP | Facility: HOSPITAL | Age: 70
End: 2025-01-13
Payer: MEDICARE

## 2025-01-13 VITALS
RESPIRATION RATE: 18 BRPM | SYSTOLIC BLOOD PRESSURE: 153 MMHG | OXYGEN SATURATION: 95 % | TEMPERATURE: 97.6 F | DIASTOLIC BLOOD PRESSURE: 85 MMHG | HEART RATE: 83 BPM | WEIGHT: 240.3 LBS | HEIGHT: 71 IN | BODY MASS INDEX: 33.64 KG/M2

## 2025-01-13 DIAGNOSIS — R97.20 ELEVATED PROSTATE SPECIFIC ANTIGEN (PSA): ICD-10-CM

## 2025-01-13 DIAGNOSIS — R94.39 ABNORMAL NUCLEAR STRESS TEST: ICD-10-CM

## 2025-01-13 DIAGNOSIS — I48.0 AF (PAROXYSMAL ATRIAL FIBRILLATION): ICD-10-CM

## 2025-01-13 PROCEDURE — 76942 ECHO GUIDE FOR BIOPSY: CPT | Performed by: UROLOGY

## 2025-01-13 PROCEDURE — 25010000002 MIDAZOLAM PER 1MG: Performed by: ANESTHESIOLOGY

## 2025-01-13 PROCEDURE — 25010000002 METOCLOPRAMIDE PER 10 MG: Performed by: ANESTHESIOLOGY

## 2025-01-13 PROCEDURE — 88305 TISSUE EXAM BY PATHOLOGIST: CPT | Performed by: UROLOGY

## 2025-01-13 PROCEDURE — 55700 PR PROSTATE NEEDLE BIOPSY ANY APPROACH: CPT | Performed by: UROLOGY

## 2025-01-13 PROCEDURE — S0260 H&P FOR SURGERY: HCPCS | Performed by: UROLOGY

## 2025-01-13 PROCEDURE — 25010000002 FENTANYL CITRATE (PF) 50 MCG/ML SOLUTION: Performed by: NURSE ANESTHETIST, CERTIFIED REGISTERED

## 2025-01-13 PROCEDURE — 25010000002 PROPOFOL 10 MG/ML EMULSION: Performed by: NURSE ANESTHETIST, CERTIFIED REGISTERED

## 2025-01-13 PROCEDURE — 88342 IMHCHEM/IMCYTCHM 1ST ANTB: CPT | Performed by: UROLOGY

## 2025-01-13 PROCEDURE — 25010000002 CEFTRIAXONE PER 250 MG: Performed by: UROLOGY

## 2025-01-13 PROCEDURE — 25810000003 LACTATED RINGERS PER 1000 ML: Performed by: ANESTHESIOLOGY

## 2025-01-13 PROCEDURE — 25010000002 LIDOCAINE PF 2% 2 % SOLUTION: Performed by: NURSE ANESTHETIST, CERTIFIED REGISTERED

## 2025-01-13 RX ORDER — ASPIRIN 81 MG/1
81 TABLET ORAL DAILY
Start: 2025-01-14

## 2025-01-13 RX ORDER — OXYCODONE HYDROCHLORIDE 5 MG/1
5 TABLET ORAL
Status: DISCONTINUED | OUTPATIENT
Start: 2025-01-13 | End: 2025-01-13 | Stop reason: HOSPADM

## 2025-01-13 RX ORDER — FENTANYL CITRATE 50 UG/ML
INJECTION, SOLUTION INTRAMUSCULAR; INTRAVENOUS AS NEEDED
Status: DISCONTINUED | OUTPATIENT
Start: 2025-01-13 | End: 2025-01-13 | Stop reason: SURG

## 2025-01-13 RX ORDER — ACETAMINOPHEN 325 MG/1
650 TABLET ORAL ONCE
Status: DISCONTINUED | OUTPATIENT
Start: 2025-01-13 | End: 2025-01-13 | Stop reason: HOSPADM

## 2025-01-13 RX ORDER — PROMETHAZINE HYDROCHLORIDE 25 MG/1
25 SUPPOSITORY RECTAL ONCE AS NEEDED
Status: DISCONTINUED | OUTPATIENT
Start: 2025-01-13 | End: 2025-01-13 | Stop reason: HOSPADM

## 2025-01-13 RX ORDER — SODIUM CHLORIDE 0.9 % (FLUSH) 0.9 %
3 SYRINGE (ML) INJECTION EVERY 12 HOURS SCHEDULED
Status: DISCONTINUED | OUTPATIENT
Start: 2025-01-13 | End: 2025-01-13 | Stop reason: HOSPADM

## 2025-01-13 RX ORDER — ONDANSETRON 2 MG/ML
4 INJECTION INTRAMUSCULAR; INTRAVENOUS ONCE AS NEEDED
Status: DISCONTINUED | OUTPATIENT
Start: 2025-01-13 | End: 2025-01-13 | Stop reason: HOSPADM

## 2025-01-13 RX ORDER — SODIUM CHLORIDE 9 MG/ML
40 INJECTION, SOLUTION INTRAVENOUS AS NEEDED
Status: DISCONTINUED | OUTPATIENT
Start: 2025-01-13 | End: 2025-01-13 | Stop reason: HOSPADM

## 2025-01-13 RX ORDER — FAMOTIDINE 10 MG/ML
20 INJECTION, SOLUTION INTRAVENOUS
Status: COMPLETED | OUTPATIENT
Start: 2025-01-13 | End: 2025-01-13

## 2025-01-13 RX ORDER — ONDANSETRON 4 MG/1
4 TABLET, ORALLY DISINTEGRATING ORAL ONCE AS NEEDED
Status: DISCONTINUED | OUTPATIENT
Start: 2025-01-13 | End: 2025-01-13 | Stop reason: HOSPADM

## 2025-01-13 RX ORDER — SODIUM CHLORIDE 0.9 % (FLUSH) 0.9 %
10 SYRINGE (ML) INJECTION AS NEEDED
Status: DISCONTINUED | OUTPATIENT
Start: 2025-01-13 | End: 2025-01-13 | Stop reason: HOSPADM

## 2025-01-13 RX ORDER — LIDOCAINE HYDROCHLORIDE 20 MG/ML
INJECTION, SOLUTION EPIDURAL; INFILTRATION; INTRACAUDAL; PERINEURAL AS NEEDED
Status: DISCONTINUED | OUTPATIENT
Start: 2025-01-13 | End: 2025-01-13 | Stop reason: SURG

## 2025-01-13 RX ORDER — SODIUM CHLORIDE, SODIUM LACTATE, POTASSIUM CHLORIDE, CALCIUM CHLORIDE 600; 310; 30; 20 MG/100ML; MG/100ML; MG/100ML; MG/100ML
9 INJECTION, SOLUTION INTRAVENOUS CONTINUOUS PRN
Status: DISCONTINUED | OUTPATIENT
Start: 2025-01-13 | End: 2025-01-13 | Stop reason: HOSPADM

## 2025-01-13 RX ORDER — METOPROLOL TARTRATE 1 MG/ML
INJECTION, SOLUTION INTRAVENOUS AS NEEDED
Status: DISCONTINUED | OUTPATIENT
Start: 2025-01-13 | End: 2025-01-13 | Stop reason: SURG

## 2025-01-13 RX ORDER — PHENYLEPHRINE HCL IN 0.9% NACL 1 MG/10 ML
SYRINGE (ML) INTRAVENOUS AS NEEDED
Status: DISCONTINUED | OUTPATIENT
Start: 2025-01-13 | End: 2025-01-13 | Stop reason: SURG

## 2025-01-13 RX ORDER — ACETAMINOPHEN 500 MG
1000 TABLET ORAL ONCE
Status: COMPLETED | OUTPATIENT
Start: 2025-01-13 | End: 2025-01-13

## 2025-01-13 RX ORDER — PROPOFOL 10 MG/ML
VIAL (ML) INTRAVENOUS AS NEEDED
Status: DISCONTINUED | OUTPATIENT
Start: 2025-01-13 | End: 2025-01-13 | Stop reason: SURG

## 2025-01-13 RX ORDER — PROMETHAZINE HYDROCHLORIDE 12.5 MG/1
25 TABLET ORAL ONCE AS NEEDED
Status: DISCONTINUED | OUTPATIENT
Start: 2025-01-13 | End: 2025-01-13 | Stop reason: HOSPADM

## 2025-01-13 RX ORDER — METOCLOPRAMIDE HYDROCHLORIDE 5 MG/ML
10 INJECTION INTRAMUSCULAR; INTRAVENOUS ONCE
Status: COMPLETED | OUTPATIENT
Start: 2025-01-13 | End: 2025-01-13

## 2025-01-13 RX ORDER — IBUPROFEN 600 MG/1
600 TABLET, FILM COATED ORAL EVERY 6 HOURS PRN
Status: DISCONTINUED | OUTPATIENT
Start: 2025-01-13 | End: 2025-01-13 | Stop reason: HOSPADM

## 2025-01-13 RX ORDER — MIDAZOLAM HYDROCHLORIDE 2 MG/2ML
2 INJECTION, SOLUTION INTRAMUSCULAR; INTRAVENOUS ONCE
Status: COMPLETED | OUTPATIENT
Start: 2025-01-13 | End: 2025-01-13

## 2025-01-13 RX ORDER — SODIUM CHLORIDE 9 MG/ML
100 INJECTION, SOLUTION INTRAVENOUS ONCE
Status: DISCONTINUED | OUTPATIENT
Start: 2025-01-13 | End: 2025-01-13 | Stop reason: HOSPADM

## 2025-01-13 RX ORDER — PETROLATUM,WHITE
OINTMENT IN PACKET (GRAM) TOPICAL AS NEEDED
Status: DISCONTINUED | OUTPATIENT
Start: 2025-01-13 | End: 2025-01-13 | Stop reason: SURG

## 2025-01-13 RX ORDER — PROMETHAZINE HYDROCHLORIDE 12.5 MG/1
12.5 TABLET ORAL ONCE AS NEEDED
Status: DISCONTINUED | OUTPATIENT
Start: 2025-01-13 | End: 2025-01-13 | Stop reason: HOSPADM

## 2025-01-13 RX ADMIN — ACETAMINOPHEN 500 MG: 500 TABLET ORAL at 09:50

## 2025-01-13 RX ADMIN — SODIUM CHLORIDE 1000 MG: 9 INJECTION INTRAMUSCULAR; INTRAVENOUS; SUBCUTANEOUS at 10:39

## 2025-01-13 RX ADMIN — FENTANYL CITRATE 25 MCG: 50 INJECTION, SOLUTION INTRAMUSCULAR; INTRAVENOUS at 10:49

## 2025-01-13 RX ADMIN — Medication 200 MCG: at 11:14

## 2025-01-13 RX ADMIN — Medication 200 MCG: at 10:55

## 2025-01-13 RX ADMIN — PROPOFOL 50 MG: 10 INJECTION, EMULSION INTRAVENOUS at 10:46

## 2025-01-13 RX ADMIN — SODIUM CHLORIDE, POTASSIUM CHLORIDE, SODIUM LACTATE AND CALCIUM CHLORIDE 9 ML/HR: 600; 310; 30; 20 INJECTION, SOLUTION INTRAVENOUS at 09:59

## 2025-01-13 RX ADMIN — Medication 200 MCG: at 11:02

## 2025-01-13 RX ADMIN — METOCLOPRAMIDE 10 MG: 5 INJECTION, SOLUTION INTRAMUSCULAR; INTRAVENOUS at 10:11

## 2025-01-13 RX ADMIN — FAMOTIDINE 20 MG: 10 INJECTION INTRAVENOUS at 09:56

## 2025-01-13 RX ADMIN — LIDOCAINE HYDROCHLORIDE 100 MG: 20 INJECTION, SOLUTION INTRAVENOUS at 10:51

## 2025-01-13 RX ADMIN — PROPOFOL 150 MCG/KG/MIN: 10 INJECTION, EMULSION INTRAVENOUS at 10:46

## 2025-01-13 RX ADMIN — METOPROLOL TARTRATE 2 MG: 1 INJECTION, SOLUTION INTRAVENOUS at 10:42

## 2025-01-13 RX ADMIN — MIDAZOLAM HYDROCHLORIDE 2 MG: 1 INJECTION, SOLUTION INTRAMUSCULAR; INTRAVENOUS at 10:12

## 2025-01-13 RX ADMIN — Medication 1 PACKAGE: at 10:57

## 2025-01-13 RX ADMIN — Medication 200 MCG: at 11:06

## 2025-01-13 RX ADMIN — FENTANYL CITRATE 25 MCG: 50 INJECTION, SOLUTION INTRAMUSCULAR; INTRAVENOUS at 10:46

## 2025-01-13 RX ADMIN — Medication 200 MCG: at 11:17

## 2025-01-13 NOTE — ANESTHESIA PREPROCEDURE EVALUATION
Anesthesia Evaluation     Patient summary reviewed and Nursing notes reviewed                Airway   Mallampati: I  TM distance: >3 FB  Neck ROM: full  No difficulty expected  Dental      Pulmonary - negative pulmonary ROS and normal exam    breath sounds clear to auscultation  Cardiovascular - normal exam    Rhythm: regular  Rate: normal    (+) hypertension, CAD, dysrhythmias Paroxysmal Atrial Fib, hyperlipidemia      Neuro/Psych- negative ROS  GI/Hepatic/Renal/Endo    (+) GERD, thyroid problem     Musculoskeletal (-) negative ROS    Abdominal    Substance History - negative use     OB/GYN negative ob/gyn ROS         Other      history of cancer                Anesthesia Plan    ASA 3     general and MAC     intravenous induction     Anesthetic plan, risks, benefits, and alternatives have been provided, discussed and informed consent has been obtained with: patient.    CODE STATUS:

## 2025-01-13 NOTE — OP NOTE
Preoperative diagnosis  Elevated PSA     Postoperative diagnosis  Same as above     Procedure performed  Transrectal ultrasound, MRI-guided fusion biopsy of prostate     Attending surgeon  Flora Moore MD      Anesthesia  Monitored anesthesia care     EBL  0 mL     Complications  None     Specimen  Prostate needle biopsies     Findings  Enlarged prostate, generally firm     Indications  69 y.o. male agreed to undergo the above named procedure after discussion of the alternatives, risks and benefits.   Informed consent was obtained.       Procedure  After informed consent was obtained, the patient was taken back to the  operating suite where monitored anesthesia care was administered.  Once the  patient was adequately anesthetized, he was placed in the left lateral  decubitus position.  Digital rectal examination was performed initially.  Patient's prostate noted to be enlarged and firm.  After the digital rectal examination, the ultrasound probe was  inserted into the patient's rectum and utilizing the MRI-guided fusion  technology, a sweep with the ultrasound was obtained in order to assess the  patient's prostatic anatomy and, once the imaging was adequately integrated,  the one area of specific interest was identified.  It was able to be targeted and  biopsied.  Multiple separate core biopsies were taken the specific lesion.  Identification with the MRI fusion system indicated that the area of interest  was adequately biopsied.  Finally, a standard 12-core biopsy was performed, more than 12 cores taken to ensure adequate sampling at each site,  both right and left sides, identifying and obtaining samples of the base, mid  and apex of the prostate.  All specimens were  and passed off for  further analysis by pathology.  After obtaining all of the prostate cores,  the rectal probe was removed and pressure was held on the prostate.  There  was no significant bleeding noted after removal of the rectal  probe or  holding pressure.  4 x 4 gauze was placed just within the patient's anus and  the procedure was deemed terminated.  He was then placed back in the supine  position, awoken from anesthesia and transferred to PACU in good condition.

## 2025-01-13 NOTE — ANESTHESIA POSTPROCEDURE EVALUATION
Patient: Dav Lu    Procedure Summary       Date: 01/13/25 Room / Location: [facility] OR 02 / [facility] MAIN OR    Anesthesia Start: 1034 Anesthesia Stop: 1120    Procedure: PROSTATE ULTRASOUND BIOPSY MRI FUSION WITH URONAV  Ultrasound of the prostate through the rectum with biopsies of the prostate using MRI images (Rectum) Diagnosis:       Elevated prostate specific antigen (PSA)      (Elevated prostate specific antigen (PSA) [R97.20])    Surgeons: Flora Moore MD Provider: Celso Day MD    Anesthesia Type: general, MAC ASA Status: 3            Anesthesia Type: general, MAC    Vitals  Vitals Value Taken Time   /68 01/13/25 1151   Temp 36.4 °C (97.5 °F) 01/13/25 1145   Pulse 70 01/13/25 1155   Resp 18 01/13/25 1150   SpO2 93 % 01/13/25 1155           Post Anesthesia Care and Evaluation    Patient location during evaluation: bedside  Patient participation: complete - patient participated  Level of consciousness: awake  Pain management: adequate    Airway patency: patent  Anesthetic complications: No anesthetic complications  PONV Status: none  Cardiovascular status: acceptable and stable  Respiratory status: acceptable  Hydration status: acceptable

## 2025-01-13 NOTE — NURSING NOTE
Dr. Moore at bedside assessed patient urine in urinal. States quality of urine looks fine. Md discussed with patient the options of being sent home with a catheter or being sent home with 248 ml urine in his bladder and potentially having to come back to the ER if he becomes unable to void. Patient agreeable to being discharged home without nova expressed understanding via teach back of possibility of having to come back to the ER.

## 2025-01-13 NOTE — NURSING NOTE
Notified dr. Perez patient has 348ml urine in bladder per bladder scan. Asked MD when nova should be placed states she will be up to see patient at bedside.

## 2025-01-13 NOTE — DISCHARGE INSTRUCTIONS
Discharge Instructions Prostate Biopsy       For your surgery you had:  General anesthesia (you may have a sore throat for the first 24 hours)  You may experience dizziness, drowsiness, or lightheadedness for several hours following surgery.  Do not stay alone today or tonight.  Limit your activity for 24 hours.  You should not drive, operate machinery, drink alcohol, or sign legally binding documents for 24 hours or while you are taking pain medication.  Resume your diet slowly.  Follow any special dietary instructions you may have been given by your doctor.  Last dose of pain medication given at: tylenol 1000 mg at 9:50 am.  NOTIFY YOUR DOCTOR IF YOU EXPERIENCE ANY OF THE FOLLOWING:  Temperature greater than 101 degrees Fahrenheit  Shaking Chills  Redness or excessive drainage from incision  Nausea, vomiting and/or pain that is not controlled by prescribed medications  Increase in bleeding or bleeding that is excessive  Unable to urinate in 6 hours after surgery  If unable to reach your doctor, please go to the closest Emergency Room.   Drink 4-6 glasses of water a day for 3-4 days  You may see blood in your urine for up to a week, blood in your stool for 3-4 days, and blood in semen for up to a month  If you are passing large clots, call your doctor  Avoid lifting or straining for 48 hours  It is normal to experience burning during urination for 48 hours after biopsy  Call your doctor if pain, burning, urgency, or frequency of urination persist beyond 48 hours  Medications per physician as indicated on discharge medication information sheet    SPECIAL INSTRUCTIONS:

## 2025-01-13 NOTE — H&P
"  [facility]   Urology Preop H&P Note    Patient Name: Dav Lu  : 1955  MRN: [id number]  Primary Care Physician:  Delfin Lyn MD  Referring Physician: NOEMI Dutta  Date of admission: 2025    Subjective   Subjective     Reason for Consult/ Chief Complaint: Elevated prostate specific antigen (PSA) [R97.20]    HPI:  Dav Lu is a 69 y.o. male history ofElevated prostate specific antigen (PSA) [R97.20] who presents for further management OR.  Presents for planned Procedure(s):  PROSTATE ULTRASOUND BIOPSY MRI FUSION WITH URONAV  Ultrasound of the prostate through the rectum with biopsies of the prostate using MRI images;  .  Discussed with patient risks, alternatives, and benefits of MRI fusion biopsy including bleeding, infection, discomfort, and damage to surrounding structures.      Discussed that there is no way to \"rule out\" prostate cancer; aware that a diagnosis of prostate cancer remains uncertain unless detected on biopsy. A negative biopsy although reassuring, does not eliminate the possible need for further surveillance of PSA, possible future biopsy, and/or imaging as he may have undetected prostate cancer.      All questions were addressed after providing time for discussion.  Patient denies significant changes since last visit.  No new complaints today.    MRI prostate reveals PI-RADS 5 lesion.      Review of Systems   All systems were reviewed and negative except for the above  Personal History     Past Medical History:   Diagnosis Date    Atrial fibrillation     PAROXYSMAL/NO CP    Coronary artery disease     Disease of thyroid gland     GERD (gastroesophageal reflux disease)     Hyperlipidemia     Hypertension     Seasonal allergies        Past Surgical History:   Procedure Laterality Date    CARDIAC CATHETERIZATION N/A 2022    Procedure: Left Heart Cath with possible angioplasty;  Surgeon: Didier Segura MD;  Location: [facility] ED CATH INVASIVE " LOCATION;  Service: Cardiovascular;  Laterality: N/A;    CHOLECYSTECTOMY  2014       Family History: family history includes Heart disease in his father. Otherwise pertinent FHx was reviewed and not pertinent to current issue.    Social History:  reports that he has never smoked. He has never used smokeless tobacco. He reports that he does not currently use alcohol. He reports that he does not use drugs.    Home Medications:  Vitamin D, apixaban, aspirin, atorvastatin, cetirizine, fenofibrate, levothyroxine, metoprolol tartrate, omeprazole, oxybutynin, tadalafil, and tamsulosin    Allergies:  No Known Allergies    Objective    Objective     Vitals:        Physical Exam:   Constitutional: Awake, alert   Respiratory: Clear, nonlabored respirations    Cardiovascular: Regular rate, no chest retractions   gastrointestinal: Appears soft, nontender     Results:    Assessment & Plan   Assessment / Plan     Brief Patient Summary:  Dav Lu is a 69 y.o. male who     Active Hospital Problems:  Active Hospital Problems    Diagnosis     **Elevated prostate specific antigen (PSA)        Plan:   Proceed to the OR for planned procedure, Procedure(s):  PROSTATE ULTRASOUND BIOPSY MRI FUSION WITH URONAV  Ultrasound of the prostate through the rectum with biopsies of the prostate using MRI images,  ,   Risk, benefits, and alternatives discussed with patient at length he is agreeable to proceed  All questions addressed      Electronically signed by Flora Moore MD, 01/13/25, 8:12 AM EST.

## 2025-01-15 LAB
CYTO UR: NORMAL
LAB AP CASE REPORT: NORMAL
LAB AP CLINICAL INFORMATION: NORMAL
LAB AP SPECIAL STAINS: NORMAL
PATH REPORT.FINAL DX SPEC: NORMAL
PATH REPORT.GROSS SPEC: NORMAL

## 2025-01-22 ENCOUNTER — OFFICE VISIT (OUTPATIENT)
Dept: UROLOGY | Age: 70
End: 2025-01-22
Payer: MEDICARE

## 2025-01-22 VITALS — WEIGHT: 240 LBS | HEIGHT: 71 IN | RESPIRATION RATE: 16 BRPM | BODY MASS INDEX: 33.6 KG/M2

## 2025-01-22 DIAGNOSIS — N40.1 BENIGN PROSTATIC HYPERPLASIA WITH WEAK URINARY STREAM: ICD-10-CM

## 2025-01-22 DIAGNOSIS — R39.12 BENIGN PROSTATIC HYPERPLASIA WITH WEAK URINARY STREAM: ICD-10-CM

## 2025-01-22 DIAGNOSIS — R97.20 ELEVATED PROSTATE SPECIFIC ANTIGEN (PSA): Primary | ICD-10-CM

## 2025-01-22 DIAGNOSIS — R33.9 INCOMPLETE BLADDER EMPTYING: ICD-10-CM

## 2025-01-22 DIAGNOSIS — N52.9 ERECTILE DYSFUNCTION, UNSPECIFIED ERECTILE DYSFUNCTION TYPE: ICD-10-CM

## 2025-01-22 RX ORDER — TADALAFIL 5 MG/1
5 TABLET ORAL DAILY
Qty: 90 TABLET | Refills: 2 | Status: SHIPPED | OUTPATIENT
Start: 2025-01-22

## 2025-01-22 NOTE — PROGRESS NOTES
UROLOGY OFFICE FOLLOW UP NOTE    Subjective   HPI  Dav Lu is a 69 y.o. male.  Presents for follow-up after MRI fusion prostate biopsy 1/13/2025.  History of elevated PSA, ED on Cialis and BPH with lower urinary tract symptoms.  Hematuria evaluation negative for bladder tumor.  Plan Cialis daily.  Has not tried the Ditropan.    History of Present Illness  He is accompanied by his daughter.    He has not yet [facility] [place] [facility]  Did well after biopsy.  Notes some hematuria, now resolved.     On Eliquis and aspirin.  He has a scheduled appointment with his cardiologist on 01/29/2024.     He has been taking tamsulosin and low-dose daily Cialis, which he believes have been beneficial.     He has not previously been prescribed finasteride or Proscar.     He expresses concern about the PI-RADS 5 lesion identified on his MRI and queries about the potential need for another biopsy in a year.     Note some nocturia and urgency.  He reports no issues with incontinence but occasionally experiences dribbling, which he attributes to his recent biopsy procedure. He has found tamsulosin to be effective in managing his symptoms and notes that Cialis has improved his urinary stream. He has sufficient supply of Flomax.  Wants to stay on Cialis, needs refill.      ___________  MRI fusion prostate biopsy pathology 1/13/2025: Negative for malignancy all cores; high-grade PIN left apex; region of interest right posterior with atypical small acinar proliferation (ASAP)    MRI prostate 12/19/2024: 96 cc gland:A lesion in the right medial peripheral zone at the apex is at very high suspicion for malignancy with an overall PI-RADS score of 5. The T2 weighted signal has features more suggestive of prostatitis and   appears somewhat wedge-shaped; however, diffusion-weighted signal is intense and slightly more nodular raising the concern for malignancy   and resulting in the PI-RADS score of 5. There is 2 cm  contact length with the prostatic capsule without focal bulging or gross extraprostatic extension identified.     PSA  5/10/2024: 5.68    Cystoscopy 11/20/2024: Patent meatus; significant prostatomegaly with elongated prostatic urethra, varicosities, bilateral coapting lateral lobes; large median lobe; cystoscopy revealed one right and left ureteral orifice in the normal anatomic position, normal bladder mucosa and no tumors, masses or stones.  Mild trabeculations, no significant diverticula.     Results for orders placed or performed during the hospital encounter of 01/13/25   Tissue Pathology Exam    Collection Time: 01/13/25 10:56 AM    Specimen: A: Prostate; Tissue    B: Prostate; Tissue    C: Prostate; Tissue    D: Prostate; Tissue    E: Prostate; Tissue    F: Prostate; Tissue    G: Prostate; Tissue   Result Value Ref Range    Case Report       Surgical Pathology Report                         Case: [id number]                                  Authorizing Provider:  Flora Moore MD      Collected:           01/13/2025 10:56 AM          Ordering Location:     [facility] MAIN Received:            01/13/2025 12:35 PM                                 OR                                                                           Pathologist:           Herb Austin MD                                                            Specimens:   1) - Prostate, RIGHT BASE X 2                                                                       2) - Prostate, LEFT BASE X 2                                                                        3) - Prostate, RIGHT MID X 2                                                                        4) - Prostate, LEFT MID X 2                                                                         5) - Prostate, RIGHT APEX X 2                                                                        6) - Prostate, LEFT APEX X 2                                               "                          7) - Prostate, Prostate: Region Of Interest: Right Posterior x 7                           Clinical Information       Elevated prostate specific antigen (PSA)      Final Diagnosis       1. Prostate gland, right base, needle core biopsy:   - Benign prostatic tissue      2. Prostate gland, left base, needle core biopsy:   - Benign prostatic tissue      3. Prostate gland, right mid, needle core biopsy:   - Benign prostatic tissue      4. Prostate gland, left mid, needle core biopsy:   - Benign prostatic tissue      5. Prostate gland, right apex, needle core biopsy:   - Benign prostatic tissue      6. Prostate gland, left apex, needle core biopsy:   - High grade prostatic intraepithelial neoplasia (PIN)      7. Prostate gland, right posterior, needle core biopsy:   - Atypical small acinar proliferation      Gross Description       1. Prostate.  Received in formalin labeled \"right base x 2\" consists of 2 pink-tan cylindrical cores of soft tissue that are 1.7 and 1.9 cm in length, both with diameters averaging 0.1 cm.  The specimens are submitted entirely in cassette 1A.    2. Prostate.  Received in formalin labeled \"left base x 2\" and consists of 2 pink-tan cylindrical cores of soft tissue that are 1.3 and 1.7 cm in length, both with diameters averaging 0.1 cm.  The specimens are submitted entirely in cassette 2A.    3. Prostate.  Received in formalin labeled \"right mid x 2\" and consists of 2 pink-tan cylindrical cores of soft tissue that are 1.7 to 1.9 cm in length both with diameters averaging 0.1 cm.  The specimens are submitted entirely in cassette 3A.    4. Prostate.  Received in formalin labeled \"left mid x 2\" consists of 2 pink-tan cylindrical cores of soft tissue that are 1.1 and 1.3 cm in length, both with diameters averaging 0.1 cm.  The specimens are submitted entirely in cassette 4A.    5. Prostate.  Received in formalin labeled \"right apex x 2\" consists of 2 pink-tan cylindrical " "cores of soft tissue that are 1.1 and 1.4 cm in length with a diameter averaging 0.1 cm.  The specimens are submitted entirely in cassette 5A.   PF    6. Prostate.  Received in formalin labeled \"left apex x 2\" consists of 2 pink-tan cylindrical cores of soft tissue that are 1.6 and 1.8 cm in length both with diameters averaging 0.1 cm.  Specimens are submitted entirely in cassette 6A.  Cysts    7. Prostate.  Received in formalin labeled \"right posterior7\" consists of 7 pink-tan cylindrical cores of soft tissue ranging from 1.1 to 2.1 cm in length, all with diameters averaging 0.1 cm.  The specimens are submitted entirely in cassettes 7A-7D.     PF        Special Stains       An immunohistochemical stain for p63 is performed on block 7C to evaluate atypical glands and shows primarily retained peripheral basal cell staining, consistent with the above diagnosis.  Control stains appropriately.      Microscopic Description       Microscopic examination performed.           Review of systems  A review of systems was performed, and positive findings are noted in the HPI.    Objective     Vital Signs:   Resp 16   Ht 180.3 cm (71\")   Wt 109 kg (240 lb)   BMI 33.47 kg/m²       Physical exam  No acute distress, well-nourished  Awake alert and oriented  Mood normal; affect normal  Physical Exam      Problem List:  Patient Active Problem List   Diagnosis    Essential hypertension    Coronary artery disease involving native coronary artery of native heart without angina pectoris    AF (paroxysmal atrial fibrillation)    Hyperlipidemia LDL goal <70    BPH (benign prostatic hyperplasia)    Elevated PSA    Esophageal reflux    Larynx cancer    Elevated prostate specific antigen (PSA)       Assessment & Plan   Diagnoses and all orders for this visit:    1. Elevated prostate specific antigen (PSA) (Primary)  -     PSA DIAGNOSTIC; Future    2. Benign prostatic hyperplasia with weak urinary stream  -     tadalafil (Cialis) 5 MG " "tablet; Take 1 tablet by mouth Daily.  Dispense: 90 tablet; Refill: 2    3. Incomplete bladder emptying    4. Erectile dysfunction, unspecified erectile dysfunction type  -     tadalafil (Cialis) 5 MG tablet; Take 1 tablet by mouth Daily.  Dispense: 90 tablet; Refill: 2        Assessment & Plan  The biopsy results were negative for malignancy, which is a positive outcome. However, aware that there is no way that any urologist can \"rule out\" prostate cancer.  Warrants diligent monitoring, potential workup.      Also discussed, the presence of atypical small acinar proliferation (ASAP) and high-grade PIN which necessitates further monitoring and workup via repeat biopsy, possible MRI in a year.     Significant prostatomegaly on MRI; this enlargement is not a cause for concern unless it leads to bothersome urinary symptoms.  Did discuss various outlet procedures, surgical intervention for his prostatomegaly, outlet obstruction.  Patient was to avoid surgical procedures if possible.  Continue medical management, monitoring of his emptying.    The hematuria could be attributed to prostatic varices or varicosities, which are common in cases of enlarged prostates. Physical straining, sexual activity, constipation, riding, bouncing around on a retractor, can lead to blood in the urine. His use of Eliquis and aspirin will also exacerbate to this condition.  He states doing well from the standpoint; continue to monitor    He is currently on tamsulosin and low-dose daily Cialis, which have been beneficial for him.    Refilled Cialis.    Could consider trial of finasteride for his BPH or if recurrent hematuria the potential side effects of finasteride, including fatigue, lower libido, and chest or breast tenderness, were discussed.  Does not wish to start at this time.    The possibility of a transurethral resection of the prostate (TURP) procedure was also considered.     His PSA levels will be monitored every 3 months     A " copy of his pathology report was provided for his records.    Follow-up  The patient will follow up in 3 months, PVR, PSA prior       All questions addressed      Patient or patient representative verbalized consent for the use of Ambient Listening during the visit with  Flora Moore MD for chart documentation. 1/22/2025  17:15 EST    Total time for encounter was 45 minutes including same day preparation prior to encounter (e.g. reviewing labs, prior notes), face to face time, counseling and coordination of care and ordering medications, test.

## 2025-01-29 ENCOUNTER — OFFICE VISIT (OUTPATIENT)
Age: 70
End: 2025-01-29
Payer: MEDICARE

## 2025-01-29 VITALS
SYSTOLIC BLOOD PRESSURE: 150 MMHG | HEIGHT: 71 IN | BODY MASS INDEX: 34.02 KG/M2 | WEIGHT: 243 LBS | HEART RATE: 78 BPM | DIASTOLIC BLOOD PRESSURE: 117 MMHG

## 2025-01-29 DIAGNOSIS — I10 ESSENTIAL HYPERTENSION: ICD-10-CM

## 2025-01-29 DIAGNOSIS — E78.5 HYPERLIPIDEMIA LDL GOAL <70: ICD-10-CM

## 2025-01-29 DIAGNOSIS — R60.0 BILATERAL LEG EDEMA: ICD-10-CM

## 2025-01-29 DIAGNOSIS — I48.19 ATRIAL FIBRILLATION, PERSISTENT: Primary | ICD-10-CM

## 2025-01-29 DIAGNOSIS — I25.10 CORONARY ARTERY DISEASE INVOLVING NATIVE CORONARY ARTERY OF NATIVE HEART WITHOUT ANGINA PECTORIS: ICD-10-CM

## 2025-01-29 NOTE — ASSESSMENT & PLAN NOTE
Blood pressure elevated.  Discussed about adding another antihypertensive agent during this visit.  He prefers to continue with metoprolol for now, will do BP log and monitor blood pressure at home. Continue metoprolol tartrate 25 mg twice daily.  Amlodipine may be added as a second agent if blood pressure remains elevated on home blood pressure log.

## 2025-01-29 NOTE — ASSESSMENT & PLAN NOTE
Recent Lipid 5/2024 LDL was 52 at goal. Continue atorvastatin 10 mg nightly. Will repeat lipid panel

## 2025-01-29 NOTE — ASSESSMENT & PLAN NOTE
He is currently in atrial fibrillation with controlled ventricular rates. Denies any palpitations. Continue Eliquis 5 mg twice daily. Samples given today.

## 2025-01-29 NOTE — PROGRESS NOTES
Chief Complaint  Coronary Artery Disease, Hypertension, Atrial Fibrillation, and Follow-up    Subjective        Dav Lu presents to [facility]   Mr. Lu  a 69-year-old male presents for routine follow-up visit for CAD hypertension , atrial fibrillation , and mixed hyperlipidemia.  He denies any recent episodes of chest pain shortness of breath or palpitations.  Patient's is taking Eliquis 5 mg twice a day , denies any bleeding issues.  Patient does report some edema in bilateral legs but has been wearing compression stockings that has helped with the edema.  Patient's blood pressure is elevated at this visit.     Past Medical History:   Diagnosis Date    Atrial fibrillation     Clotting disorder September 15, 2024    Coronary artery disease     Disease of thyroid gland     Elevated PSA     Erectile dysfunction     GERD (gastroesophageal reflux disease)     Hyperlipidemia     Hypertension     Laryngeal cancer     Seasonal allergies        No Known Allergies     Past Surgical History:   Procedure Laterality Date    CARDIAC CATHETERIZATION N/A 12/13/2022    Procedure: Left Heart Cath with possible angioplasty;  Surgeon: Didier Segura MD;  Location: [facility] CATH INVASIVE LOCATION;  Service: Cardiovascular;  Laterality: N/A;    CHOLECYSTECTOMY  2014    PROSTATE BIOPSY N/A 1/13/2025    Procedure: PROSTATE ULTRASOUND BIOPSY MRI FUSION WITH URONAV  Ultrasound of the prostate through the rectum with biopsies of the prostate using MRI images;  Surgeon: Flora Moore MD;  Location: [facility] OR;  Service: Urology;  Laterality: N/A;        Social History  He  reports that he quit smoking about 41 years ago. His smoking use included cigarettes. He started smoking about 52 years ago. He has a 16.2 pack-year smoking history. He has never used smokeless tobacco. He reports that he does not currently use alcohol. He reports that he does not use drugs.    Family History  His family  "history includes Cancer in his mother; Heart disease in his father; Prostate cancer in his father.       Current Outpatient Medications on File Prior to Visit   Medication Sig    aspirin 81 MG EC tablet Take 1 tablet by mouth Daily. If urine clearing and not passing excessive clot    atorvastatin (LIPITOR) 10 MG tablet Take 1 tablet by mouth Every Night.    cetirizine (zyrTEC) 10 MG tablet Take 1 tablet by mouth Daily.    fenofibrate 160 MG tablet Take 1 tablet by mouth Daily.    levothyroxine (SYNTHROID, LEVOTHROID) 75 MCG tablet Take 1 tablet by mouth Daily.    metoprolol tartrate (LOPRESSOR) 25 MG tablet Take 1 tablet by mouth 2 (Two) Times a Day.    omeprazole (priLOSEC) 20 MG capsule Take 1 capsule by mouth Daily.    oxybutynin (DITROPAN) 5 MG tablet Take 1 tablet by mouth 3 (Three) Times a Day As Needed (urinary urgency, bladder spasms).    tadalafil (Cialis) 5 MG tablet Take 1 tablet by mouth Daily.    tamsulosin (FLOMAX) 0.4 MG capsule 24 hr capsule Take 2 capsules by mouth Every Night.    VITAMIN D PO Take  by mouth.    apixaban (ELIQUIS) 5 MG tablet tablet Take 1 tablet by mouth 2 (Two) Times a Day. If urine clearing and not passing excessive clot           Review of Systems   Respiratory:  Negative for cough, chest tightness, shortness of breath and wheezing.    Cardiovascular:  Positive for leg swelling. Negative for chest pain and palpitations.   Gastrointestinal:  Negative for blood in stool, nausea and vomiting.   Neurological:  Negative for dizziness, light-headedness and headache.        Objective   Vitals:    01/29/25 1310 01/29/25 1311 01/29/25 1345   BP: (!) 144/106 (!) 148/117 (!) 150/117   Pulse: 61 87 78   Weight: 110 kg (243 lb)     Height: 180.3 cm (71\")           Physical Exam  General : Alert, awake, no acute distress  Neck : Supple, no carotid bruit, no jugular venous distention  CVS : irregular , no murmur, no rubs or gallops  Lungs: Clear to auscultation bilaterally, no crackles or " "rhonchi  Abdomen: Soft, nontender, bowel sounds active  Extremities: Warm, well-perfused,bilateral leg edema , non pitting       Result Review     The following data was reviewed by Didier Segura MD  No results found for: \"PROBNP\"  CMP          5/10/2024    09:24   CMP   Glucose 91    BUN 24    Creatinine 1.30    EGFR 59.5    Sodium 139    Potassium 4.6    Chloride 104    Calcium 9.4    Total Protein 7.2    Albumin 4.0    Globulin 3.2    Total Bilirubin 0.7    Alkaline Phosphatase 52    AST (SGOT) 23    ALT (SGPT) 15    Albumin/Globulin Ratio 1.3    BUN/Creatinine Ratio 18.5    Anion Gap 10.5      CBC w/diff          5/10/2024    09:24   CBC w/Diff   WBC 6.38    RBC 5.20    Hemoglobin 14.3    Hematocrit 44.1    MCV 84.8    MCH 27.5    MCHC 32.4    RDW 14.5    Platelets 263    Neutrophil Rel % 69.1    Immature Granulocyte Rel % 0.2    Lymphocyte Rel % 21.8    Monocyte Rel % 6.4    Eosinophil Rel % 1.9    Basophil Rel % 0.6       Lab Results   Component Value Date    TSH 3.970 05/10/2024      Lab Results   Component Value Date    FREET4 1.18 05/10/2024             Lipid Panel          5/10/2024    09:24   Lipid Panel   Total Cholesterol 106    Triglycerides 79    HDL Cholesterol 38    VLDL Cholesterol 16    LDL Cholesterol  52    LDL/HDL Ratio 1.37          Results for orders placed in visit on 12/01/22    Adult Transthoracic Echo Complete W/ Cont if Necessary Per Protocol    Interpretation Summary    Overall LV ejection fraction is 55%.  There is moderate basal inferior wall hypokinesis    Left ventricular wall thickness is consistent with mild concentric hypertrophy.    There are no significant valvular abnormalities.    Estimated right ventricular systolic pressure from tricuspid regurgitation is normal (<35 mmHg).    Results for orders placed during the hospital encounter of 08/04/22    Stress test with myocardial perfusion one day    Interpretation Summary  · Abnormal myocardial SPECT perfusion study showing " a small to moderate sized inferolateral wall infarct with mild carissa-infarct ischemia.  · Left ventricular ejection fraction is mildly reduced. (Calculated EF = 45%). There is mild inferior wall hypokinesis.  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  · There were no new ischemic EKG changes with regadenoson infusion.  · There was no chest pain with regadenoson infusion.  · Impressions are consistent with an intermediate risk study.           Assessment and Plan   Diagnoses and all orders for this visit:    1. Atrial fibrillation, persistent (Primary)  Assessment & Plan:  He is currently in atrial fibrillation with controlled ventricular rates. Denies any palpitations. Continue Eliquis 5 mg twice daily. Samples given today.     Orders:  -     apixaban (ELIQUIS) 5 MG tablet tablet; Take 1 tablet by mouth 2 (Two) Times a Day.  Dispense: 180 tablet; Refill: 3  -     CBC & Differential; Future    2. Essential hypertension  Assessment & Plan:  Blood pressure elevated.  Discussed about adding another antihypertensive agent during this visit.  He prefers to continue with metoprolol for now, will do BP log and monitor blood pressure at home. Continue metoprolol tartrate 25 mg twice daily.  Amlodipine may be added as a second agent if blood pressure remains elevated on home blood pressure log.      3. Coronary artery disease involving native coronary artery of native heart without angina pectoris  Assessment & Plan:  He is currently stable with no angina like symptoms. Recommend to continue aspirin, statin and beta-blocker at the current dose.     Orders:  -     CBC & Differential; Future    4. Hyperlipidemia LDL goal <70  Assessment & Plan:   Recent Lipid 5/2024 LDL was 52 at goal. Continue atorvastatin 10 mg nightly. Will repeat lipid panel     Orders:  -     Comprehensive Metabolic Panel; Future  -     Lipid Panel; Future    5. Bilateral leg edema  Assessment & Plan:  Discussed watching sodium  intake and elevating legs at home, continue wearing compression stockings             Follow Up   Return in about 3 months (around 4/29/2025) for Next scheduled follow up, with NOEMI Blanton.        I spent 30 minutes caring for this patient on this date of service. This time includes time spent by me in the following activities:preparing for the visit, reviewing tests, obtaining and/or reviewing a separately obtained history, performing a medically appropriate examination and/or evaluation , counseling and educating the patient/family/caregiver, ordering medications, tests, or procedures, referring and communicating with other health care professionals , documenting information in the medical record, independently interpreting results and communicating that information with the patient/family/caregiver, and care coordination.    Didier Stewart MD  01/29/2025     Dictated Utilizing Dragon Dictation: Please note that portions of this note were completed with a voice recognition program.  Part of this note may be an electronic transcription/translation of spoken language to printed text using the Dragon Dictation System.

## 2025-01-29 NOTE — ASSESSMENT & PLAN NOTE
Discussed watching sodium intake and elevating legs at home, continue wearing compression stockings

## 2025-02-25 DIAGNOSIS — E78.5 HYPERLIPIDEMIA LDL GOAL <70: ICD-10-CM

## 2025-02-25 RX ORDER — ATORVASTATIN CALCIUM 10 MG/1
10 TABLET, FILM COATED ORAL NIGHTLY
Qty: 90 TABLET | Refills: 3 | Status: SHIPPED | OUTPATIENT
Start: 2025-02-25

## 2025-03-13 ENCOUNTER — TELEPHONE (OUTPATIENT)
Dept: CARDIOLOGY | Facility: CLINIC | Age: 70
End: 2025-03-13
Payer: MEDICARE

## 2025-03-21 ENCOUNTER — TELEPHONE (OUTPATIENT)
Dept: CARDIOLOGY | Facility: CLINIC | Age: 70
End: 2025-03-21
Payer: MEDICARE

## 2025-03-21 RX ORDER — AMLODIPINE BESYLATE 5 MG/1
5 TABLET ORAL DAILY
Qty: 30 TABLET | Refills: 3 | Status: SHIPPED | OUTPATIENT
Start: 2025-03-21

## 2025-03-29 ENCOUNTER — TELEPHONE (OUTPATIENT)
Dept: CARDIOLOGY | Facility: CLINIC | Age: 70
End: 2025-03-29
Payer: MEDICARE

## 2025-03-29 NOTE — TELEPHONE ENCOUNTER
The [facility] received a fax that requires your attention. The document has been indexed to the patient’s chart for your review.      Reason for sending: EXTERNAL MEDICAL RECORD NOTIFICATION     Documents Description: APPROVAL-[facility] PAF-3.28.25    Name of Sender: NOÉ BURROWS     Date Indexed: 3.28.25

## 2025-04-17 ENCOUNTER — LAB (OUTPATIENT)
Dept: LAB | Facility: HOSPITAL | Age: 70
End: 2025-04-17
Payer: MEDICARE

## 2025-04-17 DIAGNOSIS — E78.5 HYPERLIPIDEMIA LDL GOAL <70: ICD-10-CM

## 2025-04-17 DIAGNOSIS — R97.20 ELEVATED PROSTATE SPECIFIC ANTIGEN (PSA): ICD-10-CM

## 2025-04-17 DIAGNOSIS — I25.10 CORONARY ARTERY DISEASE INVOLVING NATIVE CORONARY ARTERY OF NATIVE HEART WITHOUT ANGINA PECTORIS: Primary | ICD-10-CM

## 2025-04-17 DIAGNOSIS — I48.19 ATRIAL FIBRILLATION, PERSISTENT: ICD-10-CM

## 2025-04-17 LAB — PSA SERPL-MCNC: 5.88 NG/ML (ref 0–4)

## 2025-04-17 PROCEDURE — 36415 COLL VENOUS BLD VENIPUNCTURE: CPT

## 2025-04-17 PROCEDURE — 84153 ASSAY OF PSA TOTAL: CPT

## 2025-04-22 ENCOUNTER — LAB (OUTPATIENT)
Dept: LAB | Facility: HOSPITAL | Age: 70
End: 2025-04-22
Payer: MEDICARE

## 2025-04-22 DIAGNOSIS — E78.5 HYPERLIPIDEMIA LDL GOAL <70: ICD-10-CM

## 2025-04-22 DIAGNOSIS — I25.10 CORONARY ARTERY DISEASE INVOLVING NATIVE CORONARY ARTERY OF NATIVE HEART WITHOUT ANGINA PECTORIS: ICD-10-CM

## 2025-04-22 DIAGNOSIS — I48.19 ATRIAL FIBRILLATION, PERSISTENT: ICD-10-CM

## 2025-04-22 LAB
ALBUMIN SERPL-MCNC: 4.1 G/DL (ref 3.5–5.2)
ALBUMIN/GLOB SERPL: 1.5 G/DL
ALP SERPL-CCNC: 62 U/L (ref 39–117)
ALT SERPL W P-5'-P-CCNC: 16 U/L (ref 1–41)
ANION GAP SERPL CALCULATED.3IONS-SCNC: 8 MMOL/L (ref 5–15)
AST SERPL-CCNC: 25 U/L (ref 1–40)
BASOPHILS # BLD AUTO: 0.03 10*3/MM3 (ref 0–0.2)
BASOPHILS NFR BLD AUTO: 0.6 % (ref 0–1.5)
BILIRUB SERPL-MCNC: 0.4 MG/DL (ref 0–1.2)
BUN SERPL-MCNC: 22 MG/DL (ref 8–23)
BUN/CREAT SERPL: 18.8 (ref 7–25)
CALCIUM SPEC-SCNC: 9.2 MG/DL (ref 8.6–10.5)
CHLORIDE SERPL-SCNC: 105 MMOL/L (ref 98–107)
CHOLEST SERPL-MCNC: 111 MG/DL (ref 0–200)
CO2 SERPL-SCNC: 25 MMOL/L (ref 22–29)
CREAT SERPL-MCNC: 1.17 MG/DL (ref 0.76–1.27)
DEPRECATED RDW RBC AUTO: 48.6 FL (ref 37–54)
EGFRCR SERPLBLD CKD-EPI 2021: 67.1 ML/MIN/1.73
EOSINOPHIL # BLD AUTO: 0.11 10*3/MM3 (ref 0–0.4)
EOSINOPHIL NFR BLD AUTO: 2.2 % (ref 0.3–6.2)
ERYTHROCYTE [DISTWIDTH] IN BLOOD BY AUTOMATED COUNT: 16.2 % (ref 12.3–15.4)
GLOBULIN UR ELPH-MCNC: 2.8 GM/DL
GLUCOSE SERPL-MCNC: 101 MG/DL (ref 65–99)
HCT VFR BLD AUTO: 40.3 % (ref 37.5–51)
HDLC SERPL-MCNC: 38 MG/DL (ref 40–60)
HGB BLD-MCNC: 12.9 G/DL (ref 13–17.7)
IMM GRANULOCYTES # BLD AUTO: 0.01 10*3/MM3 (ref 0–0.05)
IMM GRANULOCYTES NFR BLD AUTO: 0.2 % (ref 0–0.5)
LDLC SERPL CALC-MCNC: 50 MG/DL (ref 0–100)
LDLC/HDLC SERPL: 1.26 {RATIO}
LYMPHOCYTES # BLD AUTO: 1 10*3/MM3 (ref 0.7–3.1)
LYMPHOCYTES NFR BLD AUTO: 20 % (ref 19.6–45.3)
MCH RBC QN AUTO: 26.2 PG (ref 26.6–33)
MCHC RBC AUTO-ENTMCNC: 32 G/DL (ref 31.5–35.7)
MCV RBC AUTO: 81.7 FL (ref 79–97)
MONOCYTES # BLD AUTO: 0.37 10*3/MM3 (ref 0.1–0.9)
MONOCYTES NFR BLD AUTO: 7.4 % (ref 5–12)
NEUTROPHILS NFR BLD AUTO: 3.49 10*3/MM3 (ref 1.7–7)
NEUTROPHILS NFR BLD AUTO: 69.6 % (ref 42.7–76)
PLATELET # BLD AUTO: 202 10*3/MM3 (ref 140–450)
PMV BLD AUTO: 10.8 FL (ref 6–12)
POTASSIUM SERPL-SCNC: 4.3 MMOL/L (ref 3.5–5.2)
PROT SERPL-MCNC: 6.9 G/DL (ref 6–8.5)
RBC # BLD AUTO: 4.93 10*6/MM3 (ref 4.14–5.8)
SODIUM SERPL-SCNC: 138 MMOL/L (ref 136–145)
TRIGL SERPL-MCNC: 126 MG/DL (ref 0–150)
VLDLC SERPL-MCNC: 23 MG/DL (ref 5–40)
WBC NRBC COR # BLD AUTO: 5.01 10*3/MM3 (ref 3.4–10.8)

## 2025-04-22 PROCEDURE — 85025 COMPLETE CBC W/AUTO DIFF WBC: CPT

## 2025-04-22 PROCEDURE — 80061 LIPID PANEL: CPT

## 2025-04-22 PROCEDURE — 80053 COMPREHEN METABOLIC PANEL: CPT

## 2025-04-22 PROCEDURE — 36415 COLL VENOUS BLD VENIPUNCTURE: CPT

## 2025-04-23 ENCOUNTER — OFFICE VISIT (OUTPATIENT)
Dept: UROLOGY | Age: 70
End: 2025-04-23
Payer: MEDICARE

## 2025-04-23 VITALS — HEIGHT: 71 IN | WEIGHT: 242.51 LBS | BODY MASS INDEX: 33.95 KG/M2 | RESPIRATION RATE: 16 BRPM

## 2025-04-23 DIAGNOSIS — R33.9 INCOMPLETE BLADDER EMPTYING: ICD-10-CM

## 2025-04-23 DIAGNOSIS — N40.1 BENIGN PROSTATIC HYPERPLASIA WITH WEAK URINARY STREAM: Primary | ICD-10-CM

## 2025-04-23 DIAGNOSIS — N52.9 ERECTILE DYSFUNCTION, UNSPECIFIED ERECTILE DYSFUNCTION TYPE: ICD-10-CM

## 2025-04-23 DIAGNOSIS — R97.20 ELEVATED PROSTATE SPECIFIC ANTIGEN (PSA): ICD-10-CM

## 2025-04-23 DIAGNOSIS — R39.12 BENIGN PROSTATIC HYPERPLASIA WITH WEAK URINARY STREAM: Primary | ICD-10-CM

## 2025-04-23 LAB — URINE VOLUME: 92

## 2025-04-23 NOTE — PROGRESS NOTES
UROLOGY OFFICE FOLLOW UP NOTE    Subjective   HPI  Dav Lu is a 69 y.o. male. Presents for follow-up after MRI fusion prostate biopsy 1/13/2025.  History of elevated PSA, ED on Cialis and BPH with lower urinary tract symptoms.  Hematuria evaluation negative for bladder tumor.  Plan Cialis daily.  Has not tried the Ditropan.     History of Present Illness  He is accompanied by his daughter.     He has not yet [facility]  Did well after biopsy.  Notes some hematuria, now resolved.      On Eliquis and aspirin.  He has a scheduled appointment with his cardiologist on 01/29/2024.      He has been taking tamsulosin and low-dose daily Cialis, which he believes have been beneficial.      He has not previously been prescribed finasteride or Proscar.      He expresses concern about the PI-RADS 5 lesion identified on his MRI and queries about the potential need for another biopsy in a year.      Note some nocturia and urgency.  He reports no issues with incontinence but occasionally experiences dribbling, which he attributes to his recent biopsy procedure. He has found tamsulosin to be effective in managing his symptoms and notes that Cialis has improved his urinary stream. He has sufficient supply of Flomax.  Wants to stay on Cialis, needs refill.    Update 4/23/2025: Presents for follow-up elevated PSA, BPH, history of incomplete emptying, ED.  On Cialis and tamsulosin daily.  MRI fusion prostate biopsy negative for malignancy.  PSA prior to today's visit.  History of Present Illness  He reports a stable condition with no significant changes in his urinary symptoms. His sleep pattern has been inconsistent, with one instance of uninterrupted sleep throughout the night, followed by a night of waking up every 2 hours. Typically, he experiences urination 2 to 3 times per night. He also mentions a single episode of hematuria, which resolved within 24 hours. He continues his regimen of  "Eliquis.  He has been managing his prostate condition for an extended period and perceives a slight improvement with the current medication.        ___________  MRI fusion prostate biopsy pathology 1/13/2025: Negative for malignancy all cores; high-grade PIN left apex; region of interest right posterior with atypical small acinar proliferation (ASAP)     MRI prostate 12/19/2024: 96 cc gland:A lesion in the right medial peripheral zone at the apex is at very high suspicion for malignancy with an overall PI-RADS score of 5. The T2 weighted signal has features more suggestive of prostatitis and   appears somewhat wedge-shaped; however, diffusion-weighted signal is intense and slightly more nodular raising the concern for malignancy   and resulting in the PI-RADS score of 5. There is 2 cm contact length with the prostatic capsule without focal bulging or gross extraprostatic extension identified.      PSA  4/17/2025: 5.88  5/10/2024: 5.68     Cystoscopy 11/20/2024: Patent meatus; significant prostatomegaly with elongated prostatic urethra, varicosities, bilateral coapting lateral lobes; large median lobe; cystoscopy revealed one right and left ureteral orifice in the normal anatomic position, normal bladder mucosa and no tumors, masses or stones.  Mild trabeculations, no significant diverticula.     Results for orders placed or performed in visit on 04/23/25   Bladder Scan    Collection Time: 04/23/25 10:31 AM   Result Value Ref Range    Urine Volume 92          Review of systems  A review of systems was performed, and positive findings are noted in the HPI.    Objective     Vital Signs:   Resp 16   Ht 180.3 cm (71\")   Wt 110 kg (242 lb 8.1 oz)   BMI 33.82 kg/m²       Physical exam  No acute distress, well-nourished  Awake alert and oriented  Mood normal; affect normal  Physical Exam        Bladder Scan interpretation 04/23/2025    Estimation of residual urine via BVI 3000 [address] Bladder Scan  Performed by: Nicki" MA  Residual Urine: 92 ml  Indication: Benign prostatic hyperplasia with weak urinary stream    Incomplete bladder emptying    Elevated prostate specific antigen (PSA)    Erectile dysfunction, unspecified erectile dysfunction type   Position: Supine  Examination: Incremental scanning of the suprapubic area using 2.0 MHz transducer using copious amounts of acoustic gel.   Findings: An anechoic area was demonstrated which represented the bladder, with measurement of residual urine as noted. I inspected this myself. In that the residual urine was stable, refer to plan for treatment and plan necessary at this time.     Problem List:  Patient Active Problem List   Diagnosis    Essential hypertension    Coronary artery disease involving native coronary artery of native heart without angina pectoris    Atrial fibrillation, persistent    Hyperlipidemia LDL goal <70    BPH (benign prostatic hyperplasia)    Elevated PSA    Esophageal reflux    Larynx cancer    Elevated prostate specific antigen (PSA)    Bilateral leg edema       Assessment & Plan   Diagnoses and all orders for this visit:    1. Benign prostatic hyperplasia with weak urinary stream (Primary)  -     Bladder Scan    2. Incomplete bladder emptying    3. Elevated prostate specific antigen (PSA)  -     PSA DIAGNOSTIC; Future    4. Erectile dysfunction, unspecified erectile dysfunction type        Assessment & Plan  1. Enlarged prostate.  His Prostate-Specific Antigen (PSA) levels remain stable at 5.88, with a recent biopsy yielding negative results for malignancy. The prostate is significantly enlarged, measuring approximately 96 grams, but the PSA density is notably low at 0.06.     He has been informed that procedural management could be considered if urinary symptoms become particularly bothersome. This would involve a debulking procedure to facilitate better urination.     He has been advised to continue monitoring his bladder emptying and PSA levels.     A  repeat MRI may be considered in a year unless significant changes occur.         Patient encouraged to follow-up 3 months, PVR, with PSA prior  All questions addressed      Patient or patient representative verbalized consent for the use of Ambient Listening during the visit with  Flora Moore MD for chart documentation. 4/23/2025  12:49 EDT

## 2025-04-29 ENCOUNTER — OFFICE VISIT (OUTPATIENT)
Age: 70
End: 2025-04-29
Payer: MEDICARE

## 2025-04-29 VITALS
DIASTOLIC BLOOD PRESSURE: 59 MMHG | SYSTOLIC BLOOD PRESSURE: 91 MMHG | HEIGHT: 71 IN | HEART RATE: 69 BPM | BODY MASS INDEX: 34.72 KG/M2 | WEIGHT: 248 LBS

## 2025-04-29 DIAGNOSIS — I48.19 ATRIAL FIBRILLATION, PERSISTENT: ICD-10-CM

## 2025-04-29 DIAGNOSIS — I10 ESSENTIAL HYPERTENSION: ICD-10-CM

## 2025-04-29 DIAGNOSIS — R60.0 BILATERAL LEG EDEMA: Primary | ICD-10-CM

## 2025-04-29 DIAGNOSIS — E78.5 HYPERLIPIDEMIA LDL GOAL <70: ICD-10-CM

## 2025-04-29 DIAGNOSIS — I25.10 CORONARY ARTERY DISEASE INVOLVING NATIVE CORONARY ARTERY OF NATIVE HEART WITHOUT ANGINA PECTORIS: ICD-10-CM

## 2025-04-29 DIAGNOSIS — R06.09 DYSPNEA ON EXERTION: ICD-10-CM

## 2025-04-29 RX ORDER — FUROSEMIDE 40 MG/1
40 TABLET ORAL DAILY PRN
Qty: 30 TABLET | Refills: 3 | Status: SHIPPED | OUTPATIENT
Start: 2025-04-29

## 2025-04-29 NOTE — ASSESSMENT & PLAN NOTE
Patient is currently in atrial fibrillation with controlled rates.  He currently denies any palpitations.  Patient was approved for patient assistance on Eliquis.  Continue Eliquis 5 mg twice daily.

## 2025-04-29 NOTE — PROGRESS NOTES
Chief Complaint  Follow-up and Atrial Fibrillation    Subjective        History of Present Illness   Dav Lu presents to [facility] CARDIOLOGY     Mr. Lu is a 69-year-old male here for 3-month follow-up on coronary artery disease, hyperlipidemia, hypertension, atrial fibrillation, and bilateral leg edema.  Patient reports he has noticed an increase in his bilateral leg edema and having some dyspnea with exertion.  He reports he feels like this started more when he started his amlodipine.  He did bring a blood pressure log with him but his blood pressures are not consistent he is having some high blood pressure readings along with some very low blood pressure readings where he reports he was having some dizziness but no presyncope episodes.  He reports that when his blood pressure is low and he is having the dizziness it is always after he eats breakfast.  He is taking all of his medication as ordered.  He denies any chest pain with or without activity.  He denies any palpitations or syncope episodes.      Past Medical History:   Diagnosis Date    Atrial fibrillation     Clotting disorder September 15, 2024    Coronary artery disease     Disease of thyroid gland     Elevated PSA     Erectile dysfunction     GERD (gastroesophageal reflux disease)     Hyperlipidemia     Hypertension     Laryngeal cancer     Seasonal allergies        No Known Allergies     Past Surgical History:   Procedure Laterality Date    CARDIAC CATHETERIZATION N/A 12/13/2022    Procedure: Left Heart Cath with possible angioplasty;  Surgeon: Didier Segura MD;  Location: [facility] INVASIVE LOCATION;  Service: Cardiovascular;  Laterality: N/A;    CHOLECYSTECTOMY  2014    PROSTATE BIOPSY N/A 1/13/2025    Procedure: PROSTATE ULTRASOUND BIOPSY MRI FUSION WITH URONAV  Ultrasound of the prostate through the rectum with biopsies of the prostate using MRI images;  Surgeon: Flora Moore MD;  Location: [facility] OR;   Service: Urology;  Laterality: N/A;        Social History  He  reports that he quit smoking about 42 years ago. His smoking use included cigarettes. He started smoking about 52 years ago. He has a 16.2 pack-year smoking history. He has never used smokeless tobacco. He reports that he does not currently use alcohol. He reports that he does not use drugs.    Family History  His family history includes Cancer in his mother; Heart disease in his father; Prostate cancer in his father.       Current Outpatient Medications on File Prior to Visit   Medication Sig    amLODIPine (NORVASC) 5 MG tablet Take 1 tablet by mouth Daily.    apixaban (ELIQUIS) 5 MG tablet tablet Take 1 tablet by mouth 2 (Two) Times a Day.    apixaban (ELIQUIS) 5 MG tablet tablet Take 1 tablet by mouth 2 (Two) Times a Day.    aspirin 81 MG EC tablet Take 1 tablet by mouth Daily. If urine clearing and not passing excessive clot    atorvastatin (LIPITOR) 10 MG tablet TAKE 1 TABLET BY MOUTH EVERY NIGHT    cetirizine (zyrTEC) 10 MG tablet Take 1 tablet by mouth Daily.    fenofibrate 160 MG tablet Take 1 tablet by mouth Daily.    levothyroxine (SYNTHROID, LEVOTHROID) 75 MCG tablet Take 1 tablet by mouth Daily.    metoprolol tartrate (LOPRESSOR) 25 MG tablet Take 1 tablet by mouth 2 (Two) Times a Day.    omeprazole (priLOSEC) 20 MG capsule Take 1 capsule by mouth Daily.    oxybutynin (DITROPAN) 5 MG tablet Take 1 tablet by mouth 3 (Three) Times a Day As Needed (urinary urgency, bladder spasms).    tadalafil (Cialis) 5 MG tablet Take 1 tablet by mouth Daily.    tamsulosin (FLOMAX) 0.4 MG capsule 24 hr capsule Take 2 capsules by mouth Every Night.    VITAMIN D PO Take  by mouth.     No current facility-administered medications on file prior to visit.         Review of Systems   Respiratory:  Positive for shortness of breath. Negative for chest tightness.    Cardiovascular:  Positive for leg swelling. Negative for chest pain and palpitations.  "  Gastrointestinal:  Negative for nausea, vomiting and indigestion.   Neurological:  Negative for dizziness, syncope and light-headedness.        Objective   Vitals:    04/29/25 1031   BP: 91/59   Pulse: 69   Weight: 112 kg (248 lb)   Height: 180.3 cm (70.98\")         Physical Exam   General : Alert, awake, no acute distress  Neck : Supple, no carotid bruit, no jugular venous distention  CVS : Irregular rhythm, no murmur, no rubs or gallops  Lungs: Clear to auscultation bilaterally, no crackles or rhonchi  Abdomen: Soft, nontender, bowel sounds active  Extremities: Warm, well-perfused, 1-2+ pitting edema in bilateral legs    Result Review     The following data was reviewed by NOEMI Blanton  No results found for: \"PROBNP\"  CMP          5/10/2024    09:24 4/22/2025    07:33   CMP   Glucose 91  101    BUN 24  22    Creatinine 1.30  1.17    EGFR 59.5  67.1    Sodium 139  138    Potassium 4.6  4.3    Chloride 104  105    Calcium 9.4  9.2    Total Protein 7.2  6.9    Albumin 4.0  4.1    Globulin 3.2  2.8    Total Bilirubin 0.7  0.4    Alkaline Phosphatase 52  62    AST (SGOT) 23  25    ALT (SGPT) 15  16    Albumin/Globulin Ratio 1.3  1.5    BUN/Creatinine Ratio 18.5  18.8    Anion Gap 10.5  8.0      CBC w/diff          5/10/2024    09:24 4/22/2025    07:33   CBC w/Diff   WBC 6.38  5.01    RBC 5.20  4.93    Hemoglobin 14.3  12.9    Hematocrit 44.1  40.3    MCV 84.8  81.7    MCH 27.5  26.2    MCHC 32.4  32.0    RDW 14.5  16.2    Platelets 263  202    Neutrophil Rel % 69.1  69.6    Immature Granulocyte Rel % 0.2  0.2    Lymphocyte Rel % 21.8  20.0    Monocyte Rel % 6.4  7.4    Eosinophil Rel % 1.9  2.2    Basophil Rel % 0.6  0.6       Lab Results   Component Value Date    TSH 3.970 05/10/2024      Lab Results   Component Value Date    FREET4 1.18 05/10/2024      No results found for: \"DDIMERQUANT\"  No results found for: \"MG\"   No results found for: \"DIGOXIN\"   No results found for: \"TROPONINT\"        Lipid Panel  "         5/10/2024    09:24 4/22/2025    07:33   Lipid Panel   Total Cholesterol 106  111    Triglycerides 79  126    HDL Cholesterol 38  38    VLDL Cholesterol 16  23    LDL Cholesterol  52  50    LDL/HDL Ratio 1.37  1.26          Results for orders placed in visit on 12/01/22    Adult Transthoracic Echo Complete W/ Cont if Necessary Per Protocol    Interpretation Summary    Overall LV ejection fraction is 55%.  There is moderate basal inferior wall hypokinesis    Left ventricular wall thickness is consistent with mild concentric hypertrophy.    There are no significant valvular abnormalities.    Estimated right ventricular systolic pressure from tricuspid regurgitation is normal (<35 mmHg).    Results for orders placed during the hospital encounter of 08/04/22    Stress test with myocardial perfusion one day    Interpretation Summary  · Abnormal myocardial SPECT perfusion study showing a small to moderate sized inferolateral wall infarct with mild carissa-infarct ischemia.  · Left ventricular ejection fraction is mildly reduced. (Calculated EF = 45%). There is mild inferior wall hypokinesis.  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  · There were no new ischemic EKG changes with regadenoson infusion.  · There was no chest pain with regadenoson infusion.  · Impressions are consistent with an intermediate risk study.        Procedures        Assessment and Plan   Diagnoses and all orders for this visit:    1. Bilateral leg edema (Primary)  Assessment & Plan:  Patient has 1-2+ pitting edema on visit.  He does have dyspnea on exertion.  He has been watching his sodium intake and elevating legs at home.  Advised patient to hold amlodipine at this time.  Will start trial of Lasix 40 mg every other day as needed.  And recheck BMP in 1 week    Orders:  -     furosemide (LASIX) 40 MG tablet; Take 1 tablet by mouth Daily As Needed (every other day as needed).  Dispense: 30 tablet; Refill: 3  -      Basic Metabolic Panel; Future    2. Essential hypertension  Assessment & Plan:  Blood pressure is stable.  Patient is having a lower reading in office today but he denies being symptomatic.  Reviewed his blood pressure log and there is high and low readings advised patient to come to the office tomorrow for blood pressure check only and bring his monitor .  Due to patient having bilateral leg edema and low blood pressure readings will hold patient's amlodipine.  Continue with metoprolol tartrate 25 mg twice a day      3. Coronary artery disease involving native coronary artery of native heart without angina pectoris  Assessment & Plan:  Is currently stable with no angina or anginal-like symptoms.  Continue aspirin, statin, and beta-blockers      4. Atrial fibrillation, persistent  Assessment & Plan:  Patient is currently in atrial fibrillation with controlled rates.  He currently denies any palpitations.  Patient was approved for patient assistance on Eliquis.  Continue Eliquis 5 mg twice daily.      5. Hyperlipidemia LDL goal <70  Assessment & Plan:   Recent lipid panel 4/22/2025 LDL 58, HDL 38, and triglycerides 126.  This is well-controlled continue atorvastatin 10 mg at night and fenofibrate 160 mg daily      6. Dyspnea on exertion  Assessment & Plan:  Patient has had increased bilateral leg edema with dyspnea on exertion.  Will order echocardiogram to evaluate heart function and valves.  Last echocardiogram was 12/1/2022 EF was 55%.  There was no significant valvular abnormalities    Orders:  -     Adult Transthoracic Echo Complete W/ Cont if Necessary Per Protocol; Future        [id number] Score: 3         Follow Up   Return in about 6 months (around 10/29/2025) for with Dr Segura.    Dav Lu  reports that he quit smoking about 42 years ago. His smoking use included cigarettes. He started smoking about 52 years ago. He has a 16.2 pack-year smoking history. He has never used smokeless tobacco. I have  educated him on the risk of diseases from using tobacco products such as cancer, COPD, and heart disease.                Patient was given instructions and counseling regarding his condition or for health maintenance advice. Please see specific information pulled into the AVS if appropriate.     Signed,  Azul Blevins, APRN  04/29/2025     Dictated Utilizing Dragon Dictation: Please note that portions of this note were completed with a voice recognition program.  Part of this note may be an electronic transcription/translation of spoken language to printed text using the Dragon Dictation System.

## 2025-04-29 NOTE — ASSESSMENT & PLAN NOTE
Recent lipid panel 4/22/2025 LDL 58, HDL 38, and triglycerides 126.  This is well-controlled continue atorvastatin 10 mg at night and fenofibrate 160 mg daily

## 2025-04-29 NOTE — ASSESSMENT & PLAN NOTE
Patient has had increased bilateral leg edema with dyspnea on exertion.  Will order echocardiogram to evaluate heart function and valves.  Last echocardiogram was 12/1/2022 EF was 55%.  There was no significant valvular abnormalities

## 2025-04-29 NOTE — ASSESSMENT & PLAN NOTE
Is currently stable with no angina or anginal-like symptoms.  Continue aspirin, statin, and beta-blockers

## 2025-04-29 NOTE — ASSESSMENT & PLAN NOTE
Patient has 1-2+ pitting edema on visit.  He does have dyspnea on exertion.  He has been watching his sodium intake and elevating legs at home.  Advised patient to hold amlodipine at this time.  Will start trial of Lasix 40 mg every other day as needed.  And recheck BMP in 1 week

## 2025-04-29 NOTE — ASSESSMENT & PLAN NOTE
Blood pressure is stable.  Patient is having a lower reading in office today but he denies being symptomatic.  Reviewed his blood pressure log and there is high and low readings advised patient to come to the office tomorrow for blood pressure check only and bring his monitor .  Due to patient having bilateral leg edema and low blood pressure readings will hold patient's amlodipine.  Continue with metoprolol tartrate 25 mg twice a day

## 2025-04-30 ENCOUNTER — CLINICAL SUPPORT (OUTPATIENT)
Age: 70
End: 2025-04-30
Payer: MEDICARE

## 2025-04-30 VITALS — SYSTOLIC BLOOD PRESSURE: 120 MMHG | HEART RATE: 98 BPM | DIASTOLIC BLOOD PRESSURE: 87 MMHG

## 2025-04-30 DIAGNOSIS — I10 ESSENTIAL HYPERTENSION: Primary | ICD-10-CM

## 2025-04-30 NOTE — PROGRESS NOTES
Patient came to clinic for blood pressure check only.  Patient brought his blood pressure monitoring system to compare with ours.  Patient's blood pressure cuff was reading 144/99.  Our readings was 120/87.  Discussed with patient that his blood pressure machine is running higher.  He may want to try new batteries or get a new cuff since this one is several years older.  Discussed with patient to continue monitoring his blood pressure at home.  At this time would not make medication adjustments based on readings since his blood pressure cuff is reading higher and not as accurate as ours  Encourage patient to follow through with echocardiogram.  and to contact the office with any further issues

## 2025-05-21 ENCOUNTER — TELEPHONE (OUTPATIENT)
Age: 70
End: 2025-05-21
Payer: MEDICARE

## 2025-05-21 NOTE — TELEPHONE ENCOUNTER
ISABEL, voiced understanding of lab orders, stated he will have them done before the end of the week.

## 2025-05-29 ENCOUNTER — LAB (OUTPATIENT)
Dept: LAB | Facility: HOSPITAL | Age: 70
End: 2025-05-29
Payer: MEDICARE

## 2025-05-29 DIAGNOSIS — R60.0 BILATERAL LEG EDEMA: ICD-10-CM

## 2025-05-29 LAB
ANION GAP SERPL CALCULATED.3IONS-SCNC: 11.2 MMOL/L (ref 5–15)
BUN SERPL-MCNC: 21 MG/DL (ref 8–23)
BUN/CREAT SERPL: 16.2 (ref 7–25)
CALCIUM SPEC-SCNC: 9.5 MG/DL (ref 8.6–10.5)
CHLORIDE SERPL-SCNC: 102 MMOL/L (ref 98–107)
CO2 SERPL-SCNC: 25.8 MMOL/L (ref 22–29)
CREAT SERPL-MCNC: 1.3 MG/DL (ref 0.76–1.27)
EGFRCR SERPLBLD CKD-EPI 2021: 59.1 ML/MIN/1.73
GLUCOSE SERPL-MCNC: 97 MG/DL (ref 65–99)
POTASSIUM SERPL-SCNC: 4.3 MMOL/L (ref 3.5–5.2)
SODIUM SERPL-SCNC: 139 MMOL/L (ref 136–145)

## 2025-05-29 PROCEDURE — 36415 COLL VENOUS BLD VENIPUNCTURE: CPT

## 2025-05-29 PROCEDURE — 80048 BASIC METABOLIC PNL TOTAL CA: CPT

## 2025-05-30 ENCOUNTER — RESULTS FOLLOW-UP (OUTPATIENT)
Age: 70
End: 2025-05-30
Payer: MEDICARE

## 2025-05-30 NOTE — TELEPHONE ENCOUNTER
----- Message from Azul Blevins sent at 5/30/2025  9:42 AM EDT -----  Please advise patient that his kidney functions are stable on recent lab work  ----- Message -----  From: Lab, Background User  Sent: 5/29/2025   4:39 PM EDT  To: NOEMI Blanton

## 2025-05-30 NOTE — TELEPHONE ENCOUNTER
LVM fir the following message, per NOEMI Blanton:    Please advise patient that his kidney functions are stable on recent lab work     Also left the office telephone number if he has any questions or concerns.

## 2025-07-22 ENCOUNTER — LAB (OUTPATIENT)
Dept: LAB | Facility: HOSPITAL | Age: 70
End: 2025-07-22
Payer: MEDICARE

## 2025-07-22 DIAGNOSIS — R97.20 ELEVATED PROSTATE SPECIFIC ANTIGEN (PSA): ICD-10-CM

## 2025-07-22 LAB — PSA SERPL-MCNC: 9.39 NG/ML (ref 0–4)

## 2025-07-22 PROCEDURE — 84153 ASSAY OF PSA TOTAL: CPT

## 2025-07-22 PROCEDURE — 36415 COLL VENOUS BLD VENIPUNCTURE: CPT

## 2025-07-24 ENCOUNTER — OFFICE VISIT (OUTPATIENT)
Dept: UROLOGY | Age: 70
End: 2025-07-24
Payer: MEDICARE

## 2025-07-24 VITALS — RESPIRATION RATE: 18 BRPM | BODY MASS INDEX: 35.5 KG/M2 | WEIGHT: 248 LBS | HEIGHT: 70 IN

## 2025-07-24 DIAGNOSIS — R97.20 ELEVATED PROSTATE SPECIFIC ANTIGEN (PSA): Primary | ICD-10-CM

## 2025-07-24 DIAGNOSIS — R33.9 INCOMPLETE BLADDER EMPTYING: ICD-10-CM

## 2025-07-24 DIAGNOSIS — N52.9 ERECTILE DYSFUNCTION, UNSPECIFIED ERECTILE DYSFUNCTION TYPE: ICD-10-CM

## 2025-07-24 DIAGNOSIS — R39.12 BENIGN PROSTATIC HYPERPLASIA WITH WEAK URINARY STREAM: ICD-10-CM

## 2025-07-24 DIAGNOSIS — N40.1 BENIGN PROSTATIC HYPERPLASIA WITH WEAK URINARY STREAM: ICD-10-CM

## 2025-07-24 LAB — URINE VOLUME: NORMAL

## 2025-07-24 RX ORDER — TAMSULOSIN HYDROCHLORIDE 0.4 MG/1
2 CAPSULE ORAL NIGHTLY
Qty: 180 CAPSULE | Refills: 3 | Status: SHIPPED | OUTPATIENT
Start: 2025-07-24

## 2025-07-24 RX ORDER — TADALAFIL 5 MG/1
5 TABLET ORAL DAILY
Qty: 90 TABLET | Refills: 3 | Status: SHIPPED | OUTPATIENT
Start: 2025-07-24

## 2025-07-24 NOTE — PROGRESS NOTES
UROLOGY OFFICE FOLLOW UP NOTE    Subjective   HPI  Dav Lu is a 69 y.o. adult. Presents for follow-up after MRI fusion prostate biopsy 1/13/2025.  History of elevated PSA, ED on Cialis and BPH with lower urinary tract symptoms.  Hematuria evaluation negative for bladder tumor.  Plan Cialis daily.  Has not tried the Ditropan.     History of Present Illness  He is accompanied by his daughter.     [facility]  Did well after biopsy.  Notes some hematuria, now resolved.      On Eliquis and aspirin.  He has a scheduled appointment with his cardiologist on 01/29/2024.      He has been taking tamsulosin and low-dose daily Cialis, which he believes have been beneficial.      He has not previously been prescribed finasteride or Proscar.      He expresses concern about the PI-RADS 5 lesion identified on his MRI and queries about the potential need for another biopsy in a year.      Note some nocturia and urgency.  He reports no issues with incontinence but occasionally experiences dribbling, which he attributes to his recent biopsy procedure. He has found tamsulosin to be effective in managing his symptoms and notes that Cialis has improved his urinary stream. He has sufficient supply of Flomax.  Wants to stay on Cialis, needs refill.     Update 4/23/2025: Presents for follow-up elevated PSA, BPH, history of incomplete emptying, ED.  On Cialis and tamsulosin daily.  MRI fusion prostate biopsy negative for malignancy.  PSA prior to today's visit.  History of Present Illness  He reports a stable condition with no significant changes in his urinary symptoms. His sleep pattern has been inconsistent, with one instance of uninterrupted sleep throughout the night, followed by a night of waking up every 2 hours. Typically, he experiences urination 2 to 3 times per night. He also mentions a single episode of hematuria, which resolved within 24 hours. He continues his regimen of  Eliquis.  He has been managing his prostate condition for an extended period and perceives a slight improvement with the current medication.    Update 7/24/2025: Presents for follow-up elevated PSA, BPH, history of incomplete emptying, ED. On Cialis and tamsulosin daily.  States overall doing well.  Needs refills of medication.  Denies bothersome nighttime urination recently which he is pleased with.  Denies pain, dysuria.  History of Present Illness        ___________  MRI fusion prostate biopsy pathology 1/13/2025: Negative for malignancy all cores; high-grade PIN left apex; region of interest right posterior with atypical small acinar proliferation (ASAP)     MRI prostate 12/19/2024: 96 cc gland:A lesion in the right medial peripheral zone at the apex is at very high suspicion for malignancy with an overall PI-RADS score of 5. The T2 weighted signal has features more suggestive of prostatitis and   appears somewhat wedge-shaped; however, diffusion-weighted signal is intense and slightly more nodular raising the concern for malignancy   and resulting in the PI-RADS score of 5. There is 2 cm contact length with the prostatic capsule without focal bulging or gross extraprostatic extension identified.      PSA  7/22/2025: 9.39 (PSA density 0.09)  4/17/2025: 5.88  5/10/2024: 5.68     Cystoscopy 11/20/2024: Patent meatus; significant prostatomegaly with elongated prostatic urethra, varicosities, bilateral coapting lateral lobes; large median lobe; cystoscopy revealed one right and left ureteral orifice in the normal anatomic position, normal bladder mucosa and no tumors, masses or stones.  Mild trabeculations, no significant diverticula.     Results for orders placed or performed in visit on 07/24/25   Bladder Scan    Collection Time: 07/24/25 11:09 AM   Result Value Ref Range    Urine Volume 81ml          Review of systems  A review of systems was performed, and positive findings are noted in the HPI.    Objective  "    Vital Signs:   Resp 18   Ht 177.8 cm (70\")   Wt 112 kg (248 lb)   BMI 35.58 kg/m²       Physical exam  No acute distress, well-nourished  Awake alert and oriented  Mood normal; affect normal  Physical Exam        Bladder Scan interpretation 07/24/2025    Estimation of residual urine via BVI 3000 [address] Bladder Scan  Performed by: CARLOZ Caceres  Residual Urine: 81 ml  Indication: Elevated prostate specific antigen (PSA)    Benign prostatic hyperplasia with weak urinary stream    Incomplete bladder emptying    Erectile dysfunction, unspecified erectile dysfunction type   Position: Supine  Examination: Incremental scanning of the suprapubic area using 2.0 MHz transducer using copious amounts of acoustic gel.   Findings: An anechoic area was demonstrated which represented the bladder, with measurement of residual urine as noted. I inspected this myself. In that the residual urine was stable or insignificant, refer to plan for treatment and plan necessary at this time.     Problem List:  Patient Active Problem List   Diagnosis    Dyspnea on exertion    Essential hypertension    Coronary artery disease involving native coronary artery of native heart without angina pectoris    Atrial fibrillation, persistent    Hyperlipidemia LDL goal <70    BPH (benign prostatic hyperplasia)    Elevated PSA    Esophageal reflux    Larynx cancer    Elevated prostate specific antigen (PSA)    Bilateral leg edema       Assessment & Plan   Diagnoses and all orders for this visit:    1. Elevated prostate specific antigen (PSA) (Primary)  -     PSA DIAGNOSTIC; Future    2. Benign prostatic hyperplasia with weak urinary stream  -     Bladder Scan  -     tadalafil (Cialis) 5 MG tablet; Take 1 tablet by mouth Daily.  Dispense: 90 tablet; Refill: 3    3. Incomplete bladder emptying  -     Bladder Scan  -     tamsulosin (FLOMAX) 0.4 MG capsule 24 hr capsule; Take 2 capsules by mouth Every Night.  Dispense: 180 capsule; Refill: 3    4. Erectile " dysfunction, unspecified erectile dysfunction type  -     tadalafil (Cialis) 5 MG tablet; Take 1 tablet by mouth Daily.  Dispense: 90 tablet; Refill: 3      Adequate emptying bladder with acceptable PVR; continue to monitor    Medications refilled per patient request    Significant increase of PSA from prior trends 9.39 from 5.88; patient with known significant prostatomegaly 96 mL gland  Given this PSA density remains low.    Unknown significance of increase of PSA; warrants diligent close monitoring with consideration of additional workup.  He is agreeable with this    Obtain PSA 1 month; will notify him    If persistently elevated or increasing, recommend setting up for MRI fusion prostate biopsy with repeat prostate prior.  He is agreeable to this    Await repeat CT scan; will notify him  Assessment & Plan       All questions addressed      Patient or patient representative verbalized consent for the use of Ambient Listening during the visit with  Flora Moore MD for chart documentation. 7/24/2025  12:09 EDT

## 2025-08-27 ENCOUNTER — TRANSCRIBE ORDERS (OUTPATIENT)
Dept: ADMINISTRATIVE | Facility: HOSPITAL | Age: 70
End: 2025-08-27
Payer: MEDICARE

## 2025-08-27 ENCOUNTER — LAB (OUTPATIENT)
Dept: LAB | Facility: HOSPITAL | Age: 70
End: 2025-08-27
Payer: MEDICARE

## 2025-08-27 DIAGNOSIS — E78.5 HYPERLIPIDEMIA, UNSPECIFIED HYPERLIPIDEMIA TYPE: ICD-10-CM

## 2025-08-27 DIAGNOSIS — E55.9 VITAMIN D DEFICIENCY: ICD-10-CM

## 2025-08-27 DIAGNOSIS — I10 HYPERTENSION, ESSENTIAL: Primary | ICD-10-CM

## 2025-08-27 DIAGNOSIS — I10 HYPERTENSION, ESSENTIAL: ICD-10-CM

## 2025-08-27 DIAGNOSIS — Z12.5 SPECIAL SCREENING, PROSTATE CANCER: ICD-10-CM

## 2025-08-27 DIAGNOSIS — R73.01 IMPAIRED FASTING BLOOD SUGAR: ICD-10-CM

## 2025-08-27 DIAGNOSIS — R97.20 ELEVATED PROSTATE SPECIFIC ANTIGEN (PSA): ICD-10-CM

## 2025-08-27 LAB
25(OH)D3 SERPL-MCNC: 42.3 NG/ML (ref 30–100)
ALBUMIN SERPL-MCNC: 4.1 G/DL (ref 3.5–5.2)
ALBUMIN/GLOB SERPL: 1.4 G/DL
ALP SERPL-CCNC: 52 U/L (ref 39–117)
ALT SERPL W P-5'-P-CCNC: 12 U/L (ref 1–41)
ANION GAP SERPL CALCULATED.3IONS-SCNC: 11.7 MMOL/L (ref 5–15)
AST SERPL-CCNC: 20 U/L (ref 1–40)
BASOPHILS # BLD AUTO: 0.03 10*3/MM3 (ref 0–0.2)
BASOPHILS NFR BLD AUTO: 0.7 % (ref 0–1.5)
BILIRUB SERPL-MCNC: 0.5 MG/DL (ref 0–1.2)
BUN SERPL-MCNC: 20 MG/DL (ref 8–23)
BUN/CREAT SERPL: 15.9 (ref 7–25)
CALCIUM SPEC-SCNC: 9.7 MG/DL (ref 8.6–10.5)
CHLORIDE SERPL-SCNC: 104 MMOL/L (ref 98–107)
CHOLEST SERPL-MCNC: 111 MG/DL (ref 0–200)
CO2 SERPL-SCNC: 23.3 MMOL/L (ref 22–29)
CREAT SERPL-MCNC: 1.26 MG/DL (ref 0.76–1.27)
DEPRECATED RDW RBC AUTO: 45.8 FL (ref 37–54)
EGFRCR SERPLBLD CKD-EPI 2021: 61 ML/MIN/1.73
EOSINOPHIL # BLD AUTO: 0.09 10*3/MM3 (ref 0–0.4)
EOSINOPHIL NFR BLD AUTO: 2 % (ref 0.3–6.2)
ERYTHROCYTE [DISTWIDTH] IN BLOOD BY AUTOMATED COUNT: 15.5 % (ref 12.3–15.4)
GLOBULIN UR ELPH-MCNC: 3 GM/DL
GLUCOSE SERPL-MCNC: 92 MG/DL (ref 65–99)
HBA1C MFR BLD: 5.6 % (ref 4.8–5.6)
HCT VFR BLD AUTO: 40 % (ref 37.5–51)
HDLC SERPL-MCNC: 40 MG/DL (ref 40–60)
HGB BLD-MCNC: 13.3 G/DL (ref 13–17.7)
IMM GRANULOCYTES # BLD AUTO: 0 10*3/MM3 (ref 0–0.05)
IMM GRANULOCYTES NFR BLD AUTO: 0 % (ref 0–0.5)
LDLC SERPL CALC-MCNC: 54 MG/DL (ref 0–100)
LDLC/HDLC SERPL: 1.34 {RATIO}
LYMPHOCYTES # BLD AUTO: 1.02 10*3/MM3 (ref 0.7–3.1)
LYMPHOCYTES NFR BLD AUTO: 22.1 % (ref 19.6–45.3)
MCH RBC QN AUTO: 26.8 PG (ref 26.6–33)
MCHC RBC AUTO-ENTMCNC: 33.3 G/DL (ref 31.5–35.7)
MCV RBC AUTO: 80.5 FL (ref 79–97)
MONOCYTES # BLD AUTO: 0.37 10*3/MM3 (ref 0.1–0.9)
MONOCYTES NFR BLD AUTO: 8 % (ref 5–12)
NEUTROPHILS NFR BLD AUTO: 3.1 10*3/MM3 (ref 1.7–7)
NEUTROPHILS NFR BLD AUTO: 67.2 % (ref 42.7–76)
PLATELET # BLD AUTO: 220 10*3/MM3 (ref 140–450)
PMV BLD AUTO: 9.7 FL (ref 6–12)
POTASSIUM SERPL-SCNC: 4.3 MMOL/L (ref 3.5–5.2)
PROT SERPL-MCNC: 7.1 G/DL (ref 6–8.5)
PSA SERPL-MCNC: 9.18 NG/ML (ref 0–4)
RBC # BLD AUTO: 4.97 10*6/MM3 (ref 4.14–5.8)
SODIUM SERPL-SCNC: 139 MMOL/L (ref 136–145)
TRIGL SERPL-MCNC: 88 MG/DL (ref 0–150)
TSH SERPL DL<=0.05 MIU/L-ACNC: 5.11 UIU/ML (ref 0.27–4.2)
VLDLC SERPL-MCNC: 17 MG/DL (ref 5–40)
WBC NRBC COR # BLD AUTO: 4.61 10*3/MM3 (ref 3.4–10.8)

## 2025-08-27 PROCEDURE — 82306 VITAMIN D 25 HYDROXY: CPT

## 2025-08-27 PROCEDURE — 85025 COMPLETE CBC W/AUTO DIFF WBC: CPT

## 2025-08-27 PROCEDURE — 83036 HEMOGLOBIN GLYCOSYLATED A1C: CPT

## 2025-08-27 PROCEDURE — 80061 LIPID PANEL: CPT

## 2025-08-27 PROCEDURE — 80053 COMPREHEN METABOLIC PANEL: CPT

## 2025-08-27 PROCEDURE — 84443 ASSAY THYROID STIM HORMONE: CPT

## 2025-08-27 PROCEDURE — 84153 ASSAY OF PSA TOTAL: CPT

## 2025-08-27 PROCEDURE — 36415 COLL VENOUS BLD VENIPUNCTURE: CPT

## (undated) DEVICE — GLIDESHEATH SLENDER STAINLESS STEEL KIT: Brand: GLIDESHEATH SLENDER

## (undated) DEVICE — GW INQWIRE FC PTFE STD J/1.5 .035 260

## (undated) DEVICE — MARKER,SKIN,WI/RULER AND LABELS: Brand: MEDLINE

## (undated) DEVICE — DRSNG TELFA PAD NONADH STR 1S 3X4IN

## (undated) DEVICE — TOWEL,OR,DSP,ST,BLUE,STD,4/PK,20PK/CS: Brand: MEDLINE

## (undated) DEVICE — CATH F5INF TLPIGST145 110CM6SH: Brand: INFINITI

## (undated) DEVICE — CATH LAB PACK: Brand: MEDLINE INDUSTRIES, INC.

## (undated) DEVICE — RADIFOCUS OPTITORQUE ANGIOGRAPHIC CATHETER: Brand: OPTITORQUE

## (undated) DEVICE — SHEET,DRAPE,70X85,STERILE: Brand: MEDLINE

## (undated) DEVICE — TR BAND RADIAL ARTERY COMPRESSION DEVICE: Brand: TR BAND

## (undated) DEVICE — GLOVE,SURG,SENSICARE SLT,LF,PF,7: Brand: MEDLINE

## (undated) DEVICE — MAX-CORE® DISPOSABLE CORE BIOPSY INSTRUMENT, 18G X 25CM: Brand: MAX-CORE